# Patient Record
Sex: FEMALE | Race: ASIAN | NOT HISPANIC OR LATINO
[De-identification: names, ages, dates, MRNs, and addresses within clinical notes are randomized per-mention and may not be internally consistent; named-entity substitution may affect disease eponyms.]

---

## 2017-03-13 ENCOUNTER — APPOINTMENT (OUTPATIENT)
Dept: VASCULAR SURGERY | Facility: CLINIC | Age: 77
End: 2017-03-13

## 2017-04-25 ENCOUNTER — APPOINTMENT (OUTPATIENT)
Dept: SPINE | Facility: CLINIC | Age: 77
End: 2017-04-25

## 2017-12-30 ENCOUNTER — INPATIENT (INPATIENT)
Facility: HOSPITAL | Age: 77
LOS: 0 days | Discharge: LEFT BEFORE DISCHARGE | DRG: 638 | End: 2017-12-31
Attending: INTERNAL MEDICINE | Admitting: INTERNAL MEDICINE
Payer: MEDICARE

## 2017-12-30 VITALS
TEMPERATURE: 98 F | WEIGHT: 166.01 LBS | SYSTOLIC BLOOD PRESSURE: 115 MMHG | RESPIRATION RATE: 16 BRPM | DIASTOLIC BLOOD PRESSURE: 75 MMHG | HEIGHT: 64 IN | OXYGEN SATURATION: 100 % | HEART RATE: 57 BPM

## 2017-12-30 LAB
ALBUMIN SERPL ELPH-MCNC: 3.3 G/DL — LOW (ref 3.5–5)
ALP SERPL-CCNC: 49 U/L — SIGNIFICANT CHANGE UP (ref 40–120)
ALT FLD-CCNC: 26 U/L DA — SIGNIFICANT CHANGE UP (ref 10–60)
ANION GAP SERPL CALC-SCNC: 9 MMOL/L — SIGNIFICANT CHANGE UP (ref 5–17)
APTT BLD: 22.8 SEC — LOW (ref 27.5–37.4)
AST SERPL-CCNC: 10 U/L — SIGNIFICANT CHANGE UP (ref 10–40)
BASOPHILS # BLD AUTO: 0.2 K/UL — SIGNIFICANT CHANGE UP (ref 0–0.2)
BASOPHILS NFR BLD AUTO: 1.3 % — SIGNIFICANT CHANGE UP (ref 0–2)
BILIRUB SERPL-MCNC: 0.2 MG/DL — SIGNIFICANT CHANGE UP (ref 0.2–1.2)
BUN SERPL-MCNC: 16 MG/DL — SIGNIFICANT CHANGE UP (ref 7–18)
CALCIUM SERPL-MCNC: 8.7 MG/DL — SIGNIFICANT CHANGE UP (ref 8.4–10.5)
CHLORIDE SERPL-SCNC: 97 MMOL/L — SIGNIFICANT CHANGE UP (ref 96–108)
CO2 SERPL-SCNC: 26 MMOL/L — SIGNIFICANT CHANGE UP (ref 22–31)
CREAT SERPL-MCNC: 0.91 MG/DL — SIGNIFICANT CHANGE UP (ref 0.5–1.3)
EOSINOPHIL # BLD AUTO: 0.1 K/UL — SIGNIFICANT CHANGE UP (ref 0–0.5)
EOSINOPHIL NFR BLD AUTO: 0.8 % — SIGNIFICANT CHANGE UP (ref 0–6)
GLUCOSE SERPL-MCNC: 68 MG/DL — LOW (ref 70–99)
HCT VFR BLD CALC: 38 % — SIGNIFICANT CHANGE UP (ref 34.5–45)
HGB BLD-MCNC: 11.8 G/DL — SIGNIFICANT CHANGE UP (ref 11.5–15.5)
INR BLD: 0.89 RATIO — SIGNIFICANT CHANGE UP (ref 0.88–1.16)
LYMPHOCYTES # BLD AUTO: 35.4 % — SIGNIFICANT CHANGE UP (ref 13–44)
LYMPHOCYTES # BLD AUTO: 4.1 K/UL — HIGH (ref 1–3.3)
MCHC RBC-ENTMCNC: 26.5 PG — LOW (ref 27–34)
MCHC RBC-ENTMCNC: 31.1 GM/DL — LOW (ref 32–36)
MCV RBC AUTO: 85.1 FL — SIGNIFICANT CHANGE UP (ref 80–100)
MONOCYTES # BLD AUTO: 0.6 K/UL — SIGNIFICANT CHANGE UP (ref 0–0.9)
MONOCYTES NFR BLD AUTO: 5.3 % — SIGNIFICANT CHANGE UP (ref 2–14)
NEUTROPHILS # BLD AUTO: 6.7 K/UL — SIGNIFICANT CHANGE UP (ref 1.8–7.4)
NEUTROPHILS NFR BLD AUTO: 57.1 % — SIGNIFICANT CHANGE UP (ref 43–77)
PLATELET # BLD AUTO: 198 K/UL — SIGNIFICANT CHANGE UP (ref 150–400)
POTASSIUM SERPL-MCNC: 3.4 MMOL/L — LOW (ref 3.5–5.3)
POTASSIUM SERPL-SCNC: 3.4 MMOL/L — LOW (ref 3.5–5.3)
PROT SERPL-MCNC: 6.8 G/DL — SIGNIFICANT CHANGE UP (ref 6–8.3)
PROTHROM AB SERPL-ACNC: 9.7 SEC — LOW (ref 9.8–12.7)
RBC # BLD: 4.46 M/UL — SIGNIFICANT CHANGE UP (ref 3.8–5.2)
RBC # FLD: 14 % — SIGNIFICANT CHANGE UP (ref 10.3–14.5)
SODIUM SERPL-SCNC: 132 MMOL/L — LOW (ref 135–145)
TROPONIN I SERPL-MCNC: <0.015 NG/ML — SIGNIFICANT CHANGE UP (ref 0–0.04)
WBC # BLD: 11.7 K/UL — HIGH (ref 3.8–10.5)
WBC # FLD AUTO: 11.7 K/UL — HIGH (ref 3.8–10.5)

## 2017-12-30 PROCEDURE — 71010: CPT | Mod: 26

## 2017-12-30 PROCEDURE — 72170 X-RAY EXAM OF PELVIS: CPT | Mod: 26

## 2017-12-30 RX ORDER — SODIUM CHLORIDE 9 MG/ML
1000 INJECTION INTRAMUSCULAR; INTRAVENOUS; SUBCUTANEOUS ONCE
Qty: 0 | Refills: 0 | Status: COMPLETED | OUTPATIENT
Start: 2017-12-30 | End: 2017-12-30

## 2017-12-30 RX ADMIN — SODIUM CHLORIDE 2000 MILLILITER(S): 9 INJECTION INTRAMUSCULAR; INTRAVENOUS; SUBCUTANEOUS at 23:22

## 2017-12-30 NOTE — ED PROVIDER NOTE - CARE PLAN
Principal Discharge DX:	Syncope  Secondary Diagnosis:	Hypoglycemia Principal Discharge DX:	Syncope  Secondary Diagnosis:	Hypoglycemia  Secondary Diagnosis:	Hyponatremia

## 2017-12-30 NOTE — ED ADULT NURSE NOTE - OBJECTIVE STATEMENT
pt is A&Ox3, ambulatory with assist, able to make needs known and presents with C/O syncopal episode x1 at dinner tonight. Pt also reports right hip pain from previous HX

## 2017-12-30 NOTE — ED PROVIDER NOTE - MEDICAL DECISION MAKING DETAILS
77yoF with h/o DM, CAD p/w syncope. No arrhythmia on ECG, no e/o aortic stenosis on exam, no anemia or electrolyte abnl, no definite CP/SOB to suggest PE and no hypoxia or u/l leg swelling or new pain. Noted hypoglycemia, which may be cause or syncope. Given fluids and D50. Hemodynamically stable, no focal neuro deficits. No other e/o trauma on exam. Admitted for further monitoring, w/u, and care. 77yoF with h/o DM, CAD p/w syncope. No arrhythmia on ECG, no e/o aortic stenosis on exam, no anemia or electrolyte abnl, no definite CP/SOB to suggest PE and no hypoxia or u/l leg swelling or new pain. Noted hypoglycemia, which may be cause or syncope. Given fluids and D50. Hemodynamically stable, no focal neuro deficits. No other e/o trauma on exam. Admitted for further monitoring, w/u, and care.  Family member numbers: [phone], [phone]

## 2017-12-30 NOTE — ED PROVIDER NOTE - PHYSICAL EXAMINATION
Afebrile, hemodynamically stable  NAD  Occipital contusion  No C spine TTP  EOMI  MMM  RRR, nml S1/S2, no m/r/g  Lungs CTAB, no w/r/r  Abd soft, NT, ND, nml BS, no rebound or guarding  CN's 3-12 grossly intact  DE [place] spontaneously, no leg cyanosis or edema. L hip pain, chronic per family  Skin warm, dry, no rashes or hives

## 2017-12-30 NOTE — ED PROVIDER NOTE - OBJECTIVE STATEMENT
78 y/o female with PMHx of DM, cardiac stent, arthritis presents to the ED c/o syncope x today. Pt notes she was at a restaurant when she was feeling lightheaded and diaphoretic. Pt then fell out of the chair and had a syncopal episode, unknown if she hit her head. Pt passed out of 5 mins before regaining consciousness. Pt now c/o HA, nausea, SOB. Pt denies CP, leg pain/swelling, or any other complaints. Pt is currently on Prednisone, ASA, Nadolol, Janumet. Pt allergic to Codeine (unknown) 76 y/o female with PMHx of DM, cardiac stent, arthritis presents to the ED c/o syncope x today. Pt notes she was at a restaurant when she was feeling lightheaded and diaphoretic. Pt then fell out of the chair and had a syncopal episode, unknown if she hit her head. Pt passed out of 5 mins before regaining consciousness. Pt now c/o HA, nausea, SOB. Pt denies CP, leg pain/swelling, or any other complaints. Pt is currently on Humulin Insulin, Lisinopril, Naldone, isosorbide, Lipitor, Folic Acid, Vitamin C, Triplex, Prednisone, ASA, Nadolol, Janumet. Pt allergic to Codeine (unknown) 78 y/o female with PMHx of DM, CAD s/p stent, arthritis with chronic L hip pain, presents to the ED c/o syncope x today. Pt family notes she was at a restaurant when she was feeling lightheaded and diaphoretic. Pt then fell out of the chair and had a syncopal episode, unknown if she hit her head. Pt passed out approx 5 mins before regaining consciousness per . Had appetizers but not full meal, EMS glucose ~130. Pt now c/o HA, nausea. Uncertain about SOB. Pt denies CP, leg pain/swelling, or any other complaints. Pt is currently on Humulin Insulin, Lisinopril, Naldone, isosorbide, Lipitor, Folic Acid, Vitamin C, Triplex, Prednisone, ASA, Nadolol, Janumet. Pt allergic to Codeine (unknown) 78 y/o female with PMHx of DM, CAD s/p stent, arthritis with chronic L hip pain, presents to the ED c/o syncope x today. Pt family notes she was at a restaurant when she was feeling lightheaded and diaphoretic. Pt then fell out of the chair and had a syncopal episode, unknown if she hit her head. Pt passed out approx 5 mins before regaining consciousness per . Had appetizers but not full meal, EMS glucose ~130. Pt now c/o HA, nausea. Uncertain about SOB. Pt denies CP, leg pain/swelling, or any other complaints. Pt is currently on Humulin Insulin, Lisinopril, nadolol, isosorbide, Lipitor, Folic Acid, Vitamin C, Triplex, Prednisone, ASA, Janumet. Pt allergic to Codeine (unknown)

## 2017-12-31 ENCOUNTER — TRANSCRIPTION ENCOUNTER (OUTPATIENT)
Age: 77
End: 2017-12-31

## 2017-12-31 VITALS
OXYGEN SATURATION: 99 % | RESPIRATION RATE: 16 BRPM | TEMPERATURE: 98 F | DIASTOLIC BLOOD PRESSURE: 61 MMHG | HEART RATE: 67 BPM | SYSTOLIC BLOOD PRESSURE: 155 MMHG

## 2017-12-31 DIAGNOSIS — E04.1 NONTOXIC SINGLE THYROID NODULE: ICD-10-CM

## 2017-12-31 DIAGNOSIS — M31.6 OTHER GIANT CELL ARTERITIS: ICD-10-CM

## 2017-12-31 DIAGNOSIS — Z98.891 HISTORY OF UTERINE SCAR FROM PREVIOUS SURGERY: Chronic | ICD-10-CM

## 2017-12-31 DIAGNOSIS — I25.10 ATHEROSCLEROTIC HEART DISEASE OF NATIVE CORONARY ARTERY WITHOUT ANGINA PECTORIS: Chronic | ICD-10-CM

## 2017-12-31 DIAGNOSIS — I25.10 ATHEROSCLEROTIC HEART DISEASE OF NATIVE CORONARY ARTERY WITHOUT ANGINA PECTORIS: ICD-10-CM

## 2017-12-31 DIAGNOSIS — R55 SYNCOPE AND COLLAPSE: ICD-10-CM

## 2017-12-31 DIAGNOSIS — E11.9 TYPE 2 DIABETES MELLITUS WITHOUT COMPLICATIONS: ICD-10-CM

## 2017-12-31 DIAGNOSIS — Z29.9 ENCOUNTER FOR PROPHYLACTIC MEASURES, UNSPECIFIED: ICD-10-CM

## 2017-12-31 DIAGNOSIS — I10 ESSENTIAL (PRIMARY) HYPERTENSION: ICD-10-CM

## 2017-12-31 DIAGNOSIS — E16.2 HYPOGLYCEMIA, UNSPECIFIED: ICD-10-CM

## 2017-12-31 LAB
ANION GAP SERPL CALC-SCNC: 8 MMOL/L — SIGNIFICANT CHANGE UP (ref 5–17)
BUN SERPL-MCNC: 15 MG/DL — SIGNIFICANT CHANGE UP (ref 7–18)
CALCIUM SERPL-MCNC: 8.4 MG/DL — SIGNIFICANT CHANGE UP (ref 8.4–10.5)
CHLORIDE SERPL-SCNC: 103 MMOL/L — SIGNIFICANT CHANGE UP (ref 96–108)
CHOLEST SERPL-MCNC: 165 MG/DL — SIGNIFICANT CHANGE UP (ref 10–199)
CO2 SERPL-SCNC: 25 MMOL/L — SIGNIFICANT CHANGE UP (ref 22–31)
CREAT SERPL-MCNC: 0.83 MG/DL — SIGNIFICANT CHANGE UP (ref 0.5–1.3)
GLUCOSE BLDC GLUCOMTR-MCNC: 126 MG/DL — HIGH (ref 70–99)
GLUCOSE BLDC GLUCOMTR-MCNC: 142 MG/DL — HIGH (ref 70–99)
GLUCOSE BLDC GLUCOMTR-MCNC: 151 MG/DL — HIGH (ref 70–99)
GLUCOSE SERPL-MCNC: 125 MG/DL — HIGH (ref 70–99)
HBA1C BLD-MCNC: 7.4 % — HIGH (ref 4–5.6)
HCT VFR BLD CALC: 38.7 % — SIGNIFICANT CHANGE UP (ref 34.5–45)
HDLC SERPL-MCNC: 51 MG/DL — SIGNIFICANT CHANGE UP (ref 40–125)
HGB BLD-MCNC: 11.9 G/DL — SIGNIFICANT CHANGE UP (ref 11.5–15.5)
LIPID PNL WITH DIRECT LDL SERPL: 84 MG/DL — SIGNIFICANT CHANGE UP
MAGNESIUM SERPL-MCNC: 1.8 MG/DL — SIGNIFICANT CHANGE UP (ref 1.6–2.6)
MCHC RBC-ENTMCNC: 25.6 PG — LOW (ref 27–34)
MCHC RBC-ENTMCNC: 30.7 GM/DL — LOW (ref 32–36)
MCV RBC AUTO: 83.4 FL — SIGNIFICANT CHANGE UP (ref 80–100)
PHOSPHATE SERPL-MCNC: 3.9 MG/DL — SIGNIFICANT CHANGE UP (ref 2.5–4.5)
PLATELET # BLD AUTO: 208 K/UL — SIGNIFICANT CHANGE UP (ref 150–400)
POTASSIUM SERPL-MCNC: 3.7 MMOL/L — SIGNIFICANT CHANGE UP (ref 3.5–5.3)
POTASSIUM SERPL-SCNC: 3.7 MMOL/L — SIGNIFICANT CHANGE UP (ref 3.5–5.3)
RBC # BLD: 4.65 M/UL — SIGNIFICANT CHANGE UP (ref 3.8–5.2)
RBC # FLD: 13.6 % — SIGNIFICANT CHANGE UP (ref 10.3–14.5)
SODIUM SERPL-SCNC: 136 MMOL/L — SIGNIFICANT CHANGE UP (ref 135–145)
TOTAL CHOLESTEROL/HDL RATIO MEASUREMENT: 3.2 RATIO — LOW (ref 3.3–7.1)
TRIGL SERPL-MCNC: 150 MG/DL — HIGH (ref 10–149)
TSH SERPL-MCNC: 3.03 UU/ML — SIGNIFICANT CHANGE UP (ref 0.34–4.82)
VIT B12 SERPL-MCNC: 276 PG/ML — SIGNIFICANT CHANGE UP (ref 232–1245)
WBC # BLD: 9.8 K/UL — SIGNIFICANT CHANGE UP (ref 3.8–10.5)
WBC # FLD AUTO: 9.8 K/UL — SIGNIFICANT CHANGE UP (ref 3.8–10.5)

## 2017-12-31 PROCEDURE — 70450 CT HEAD/BRAIN W/O DYE: CPT | Mod: 26

## 2017-12-31 PROCEDURE — 82306 VITAMIN D 25 HYDROXY: CPT

## 2017-12-31 PROCEDURE — 82962 GLUCOSE BLOOD TEST: CPT

## 2017-12-31 PROCEDURE — 72125 CT NECK SPINE W/O DYE: CPT | Mod: 26

## 2017-12-31 PROCEDURE — 70450 CT HEAD/BRAIN W/O DYE: CPT

## 2017-12-31 PROCEDURE — 99285 EMERGENCY DEPT VISIT HI MDM: CPT | Mod: 25

## 2017-12-31 PROCEDURE — 80061 LIPID PANEL: CPT

## 2017-12-31 PROCEDURE — 84100 ASSAY OF PHOSPHORUS: CPT

## 2017-12-31 PROCEDURE — 85027 COMPLETE CBC AUTOMATED: CPT

## 2017-12-31 PROCEDURE — 84484 ASSAY OF TROPONIN QUANT: CPT

## 2017-12-31 PROCEDURE — 85610 PROTHROMBIN TIME: CPT

## 2017-12-31 PROCEDURE — 72170 X-RAY EXAM OF PELVIS: CPT

## 2017-12-31 PROCEDURE — 80048 BASIC METABOLIC PNL TOTAL CA: CPT

## 2017-12-31 PROCEDURE — 93005 ELECTROCARDIOGRAM TRACING: CPT

## 2017-12-31 PROCEDURE — 84443 ASSAY THYROID STIM HORMONE: CPT

## 2017-12-31 PROCEDURE — 71045 X-RAY EXAM CHEST 1 VIEW: CPT

## 2017-12-31 PROCEDURE — 83735 ASSAY OF MAGNESIUM: CPT

## 2017-12-31 PROCEDURE — 85730 THROMBOPLASTIN TIME PARTIAL: CPT

## 2017-12-31 PROCEDURE — G0378: CPT

## 2017-12-31 PROCEDURE — 83036 HEMOGLOBIN GLYCOSYLATED A1C: CPT

## 2017-12-31 PROCEDURE — 82607 VITAMIN B-12: CPT

## 2017-12-31 PROCEDURE — 93306 TTE W/DOPPLER COMPLETE: CPT

## 2017-12-31 PROCEDURE — 72125 CT NECK SPINE W/O DYE: CPT

## 2017-12-31 PROCEDURE — 80053 COMPREHEN METABOLIC PANEL: CPT

## 2017-12-31 RX ORDER — ATENOLOL 25 MG/1
25 TABLET ORAL DAILY
Qty: 0 | Refills: 0 | Status: DISCONTINUED | OUTPATIENT
Start: 2017-12-31 | End: 2017-12-31

## 2017-12-31 RX ORDER — DEXTROSE 50 % IN WATER 50 %
25 SYRINGE (ML) INTRAVENOUS ONCE
Qty: 0 | Refills: 0 | Status: DISCONTINUED | OUTPATIENT
Start: 2017-12-31 | End: 2017-12-31

## 2017-12-31 RX ORDER — FOLIC ACID 0.8 MG
1 TABLET ORAL DAILY
Qty: 0 | Refills: 0 | Status: DISCONTINUED | OUTPATIENT
Start: 2017-12-31 | End: 2017-12-31

## 2017-12-31 RX ORDER — POTASSIUM CHLORIDE 20 MEQ
40 PACKET (EA) ORAL ONCE
Qty: 0 | Refills: 0 | Status: COMPLETED | OUTPATIENT
Start: 2017-12-31 | End: 2017-12-31

## 2017-12-31 RX ORDER — INSULIN LISPRO 100/ML
VIAL (ML) SUBCUTANEOUS
Qty: 0 | Refills: 0 | Status: DISCONTINUED | OUTPATIENT
Start: 2017-12-31 | End: 2017-12-31

## 2017-12-31 RX ORDER — ISOSORBIDE DINITRATE 5 MG/1
20 TABLET ORAL
Qty: 0 | Refills: 0 | Status: DISCONTINUED | OUTPATIENT
Start: 2017-12-31 | End: 2017-12-31

## 2017-12-31 RX ORDER — ASCORBIC ACID 60 MG
500 TABLET,CHEWABLE ORAL DAILY
Qty: 0 | Refills: 0 | Status: DISCONTINUED | OUTPATIENT
Start: 2017-12-31 | End: 2017-12-31

## 2017-12-31 RX ORDER — PREGABALIN 225 MG/1
1000 CAPSULE ORAL DAILY
Qty: 0 | Refills: 0 | Status: DISCONTINUED | OUTPATIENT
Start: 2017-12-31 | End: 2017-12-31

## 2017-12-31 RX ORDER — LISINOPRIL 2.5 MG/1
20 TABLET ORAL DAILY
Qty: 0 | Refills: 0 | Status: DISCONTINUED | OUTPATIENT
Start: 2017-12-31 | End: 2017-12-31

## 2017-12-31 RX ORDER — GABAPENTIN 400 MG/1
200 CAPSULE ORAL THREE TIMES A DAY
Qty: 0 | Refills: 0 | Status: DISCONTINUED | OUTPATIENT
Start: 2017-12-31 | End: 2017-12-31

## 2017-12-31 RX ORDER — ENOXAPARIN SODIUM 100 MG/ML
40 INJECTION SUBCUTANEOUS DAILY
Qty: 0 | Refills: 0 | Status: DISCONTINUED | OUTPATIENT
Start: 2017-12-31 | End: 2017-12-31

## 2017-12-31 RX ORDER — ASPIRIN/CALCIUM CARB/MAGNESIUM 324 MG
81 TABLET ORAL DAILY
Qty: 0 | Refills: 0 | Status: DISCONTINUED | OUTPATIENT
Start: 2017-12-31 | End: 2017-12-31

## 2017-12-31 RX ORDER — ATORVASTATIN CALCIUM 80 MG/1
40 TABLET, FILM COATED ORAL AT BEDTIME
Qty: 0 | Refills: 0 | Status: DISCONTINUED | OUTPATIENT
Start: 2017-12-31 | End: 2017-12-31

## 2017-12-31 RX ADMIN — ISOSORBIDE DINITRATE 20 MILLIGRAM(S): 5 TABLET ORAL at 05:49

## 2017-12-31 RX ADMIN — GABAPENTIN 200 MILLIGRAM(S): 400 CAPSULE ORAL at 05:49

## 2017-12-31 RX ADMIN — Medication 5 MILLIGRAM(S): at 05:49

## 2017-12-31 RX ADMIN — GABAPENTIN 200 MILLIGRAM(S): 400 CAPSULE ORAL at 13:47

## 2017-12-31 RX ADMIN — Medication 500 MILLIGRAM(S): at 13:47

## 2017-12-31 RX ADMIN — ATENOLOL 25 MILLIGRAM(S): 25 TABLET ORAL at 05:49

## 2017-12-31 RX ADMIN — LISINOPRIL 20 MILLIGRAM(S): 2.5 TABLET ORAL at 05:48

## 2017-12-31 RX ADMIN — Medication 1 MILLIGRAM(S): at 13:47

## 2017-12-31 RX ADMIN — Medication 81 MILLIGRAM(S): at 13:52

## 2017-12-31 RX ADMIN — ENOXAPARIN SODIUM 40 MILLIGRAM(S): 100 INJECTION SUBCUTANEOUS at 13:48

## 2017-12-31 RX ADMIN — PREGABALIN 1000 MICROGRAM(S): 225 CAPSULE ORAL at 13:48

## 2017-12-31 RX ADMIN — Medication 40 MILLIEQUIVALENT(S): at 03:17

## 2017-12-31 NOTE — H&P ADULT - RS GEN PE MLT RESP DETAILS PC
respirations non-labored/no rhonchi/no wheezes/airway patent/breath sounds equal/normal/clear to auscultation bilaterally/no rales/good air movement

## 2017-12-31 NOTE — DISCHARGE NOTE ADULT - PATIENT PORTAL LINK FT
“You can access the [employer] Patient Portal, offered by [facility], by registering with the following website: [email or web address]”

## 2017-12-31 NOTE — H&P ADULT - PROBLEM SELECTOR PLAN 5
f/u HbA1c  hold home med 22u humalog pre-breakfast and pre-dinner, janumet 50/1000  HSS  AccuCheck  DASH/low carb diet c/w home med lisinopril, nadolol (equivalent conversion to atenolol)  Monitor BP  DASH/low carb diet

## 2017-12-31 NOTE — H&P ADULT - HISTORY OF PRESENT ILLNESS
76 y/o female with PMHx of DM, cardiac stent, arthritis presents to the ED c/o syncope x today. Pt notes she was at a restaurant when she was feeling lightheaded and diaphoretic. Pt then fell out of the chair and had a syncopal episode, unknown if she hit her head. Pt passed out of 5 mins before regaining consciousness. Pt now c/o HA, nausea, SOB. Pt denies CP, leg pain/swelling, or any other complaints. Pt is currently on Humulin Insulin, Lisinopril, Naldone, isosorbide, Lipitor, Folic Acid, Vitamin C, Triplex, Prednisone, ASA, Nadolol, Janumet. Pt allergic to Codeine (unknown) 77 F, from home lives with family ambulates independently, w/ PMHx of DM, cardiac stent, arthritis presents to the ED c/o syncope x today. Pt notes she was at a restaurant when she was feeling lightheaded and diaphoretic.     Pt then fell out of the chair and had a syncopal episode, unknown if she hit her head. Pt passed out of 5 mins before regaining consciousness. Pt now c/o HA, nausea, SOB. Pt denies CP, leg pain/swelling, or any other complaints. Pt is currently on Humulin Insulin, Lisinopril, Naldone, isosorbide, Lipitor, Folic Acid, Vitamin C, Triplex, Prednisone, ASA, Nadolol, Janumet. Pt allergic to Codeine (unknown) 77 F, from home lives with family ambulates independently, w/ PMHx CAD s/p 3 stent 1998, HTN, DM, temporal arthritis, arthritis of hands and hip BIBEMS c/o witnessed syncopal episode today. Pt states she was at a social event and felt dizzy and had diaphoresis after administering herself home med regimen of insulin humalog 22units pre-dinner (she routinely checks her f/s prior to insulin injection but she didn't bring a glucometer with her to the event). After she became symptomatic, she drank juice then "passed out". According to friends/ family, the syncopal episode lasted <1 minute. Pt was sitting in a chair and didn't injure herself. Pt denied urinary incontinence tongue biting, N/V, headache, CP, SOB, neurological symptoms. Denies cigarette or EtOH usage. Pt follows up reguarly with PCP and cardiologist and claimed to have "normal" NST and ECHO recently.

## 2017-12-31 NOTE — H&P ADULT - NEUROLOGICAL DETAILS
alert and oriented x 3/responds to pain/deep reflexes intact/sensation intact/responds to verbal commands/cranial nerves intact

## 2017-12-31 NOTE — DISCHARGE NOTE ADULT - HOSPITAL COURSE
77 F, from home lives with family ambulates independently, w/ PMHx CAD s/p 3 stent 1998, HTN, DM, temporal arthritis, arthritis of hands and hip BIBEMS c/o witnessed syncopal episode today. Pt states she was at a social event and felt dizzy and had diaphoresis after administering herself home med regimen of insulin humalog 22units pre-dinner (she routinely checks her f/s prior to insulin injection but she didn't bring a glucometer with her to the event). After she became symptomatic, she drank juice then "passed out". According to friends/ family, the syncopal episode lasted <1 minute. Pt was sitting in a chair and didn't injure herself. Pt denied urinary incontinence, tongue biting, N/V, headache, CP, SOB, neurological symptoms. Denied cigarette or EtOH usage. Pt follows up reguarly with PCP and cardiologist and claimed to have "normal" NST and ECHO recently.    Pt was admitted to telemetry with syncopal episode, likely 2/2 hypoglycemia or cardiogenic causes (less likely).      Syncope, likely 2/2 hypoglycemia or cardiogenic causes (less likely)  EKG NSR VR 60 no ST/T wave changes  CT head prelim neg for acute etiologies; Cerebral volume loss and ventriculomegaly. Mild chronic ischemic changes in the frontoparietal white matter.  Echo performed.  However, results not as yet available.      Hypoglycemia.    Home diabetic meds held  Pt started on HSS/fingersticks/serum glucose monitored  A1C 7.4  ADA/DASH diet    Thyroid nodule.  CT C-spine: Hypodense nodule in the left thyroid lobe measuring up to 3 cm for which nonemergent thyroid ultrasound and/or FNA/biopsy is recommended.  TSH 3.03  Pt to followup with PCP in outpatient setting    Temporal arteritis.    Stable  c/w PO pred 5mg AM, 2.5mg PM  Pt to followup with outpatient rheumatologist    Hypertension.   c/w home med lisinopril, nadolol (equivalent conversion to atenolol)  Monitor BP  DASH/low carb diet.     CAD (coronary artery disease).    Stable  c/w cardiac meds.     Need for prophylactic measure.    IMPROVE = 2 for age and immobilization  lovenox for DVT ppx  GI ppx not indicated.     Pt's  and patient requested that patient be discharged AMA, despite risks/benefits made known and understood by patient and .   stated that he will take full responsibility for his wife.  Pt advised to followup with PCP Dr. Nae Mendenhall, [phone] immediately upon discharge.

## 2017-12-31 NOTE — H&P ADULT - PROBLEM SELECTOR PLAN 1
likely 2/2 hypoglycemia or cardiogenic causes (less likely)  pt usually checks f/s before administering humalog pre-meal  this evening she didn't check and self administered insulin  EMS f/s 92 (this is after pt felt dizzy, had juice, and had syncope)  EKG NSR VR 60 no ST/T wave changes  CT head prelim neg for acute etiologies  f/u ECHO  f/u A1c, TSH, B12  Tele monitoring likely 2/2 hypoglycemia or cardiogenic causes (less likely)  pt usually checks f/s before administering humalog pre-meal  this evening she didn't check and self administered insulin  EMS f/s 92 (this is after pt felt dizzy, had juice, and had syncope)  EKG NSR VR 60 no ST/T wave changes  CT head prelim neg for acute etiologies; Cerebral volume loss and ventriculomegaly. Mild chronic ischemic changes in the frontoparietal white matter.  f/u ECHO  f/u A1c, TSH, B12  Tele monitoring likely 2/2 hypoglycemia or cardiogenic causes (less likely)  pt usually checks f/s before administering humalog pre-meal  this evening she didn't check and self administered insulin  EMS f/s 92 (this is after pt felt dizzy, had juice, and had syncope)  EKG NSR VR 60 no ST/T wave changes  CT head prelim neg for acute etiologies; Cerebral volume loss and ventriculomegaly. Mild chronic ischemic changes in the frontoparietal white matter.  f/u ECHO  f/u A1c, TSH, B12  f/u orthostatics  Tele monitoring likely 2/2 hypoglycemia or cardiogenic causes (less likely)  pt usually checks f/s before administering humalog pre-meal  this evening she didn't check and self administered insulin  EMS f/s 92 (this is after pt felt dizzy, had juice, and had syncope)  EKG NSR VR 60 no ST/T wave changes  CT head prelim neg for acute etiologies; Cerebral volume loss and ventriculomegaly. Mild chronic ischemic changes in the frontoparietal white matter.  f/u ECHO  f/u A1c, TSH, B12  f/u orthostatics  Tele monitoring  will hold off on carotid doppler/MRI head for now as syncope is very likely 2/2 hypoglycemia likely 2/2 hypoglycemia or cardiogenic causes (less likely)  pt usually checks f/s before administering humulin 70/30 pre-meal  this evening she didn't check and self administered insulin  EMS f/s 92 (this is after pt felt dizzy, had juice, and had syncope)  EKG NSR VR 60 no ST/T wave changes  CT head prelim neg for acute etiologies; Cerebral volume loss and ventriculomegaly. Mild chronic ischemic changes in the frontoparietal white matter.  f/u ECHO  f/u A1c, TSH, B12  f/u orthostatics  Tele monitoring  will hold off on carotid doppler/MRI head for now as syncope is very likely 2/2 hypoglycemia

## 2017-12-31 NOTE — H&P ADULT - ASSESSMENT
77 F, from home lives with family ambulates independently, w/ PMHx CAD s/p 3 stent 1998, HTN, DM, temporal arthritis, arthritis of hands and hip BIBEMS c/o witnessed syncopal episode today.     Pt admitted for management of syncope likely 2/2 hypoglycemia.

## 2017-12-31 NOTE — H&P ADULT - PROBLEM SELECTOR PLAN 4
c/w home med lisinopril, nadolol (equivalent conversion to atenolol)  Monitor BP  DASH/low carb diet Stable  c/w PO pred 5mg AM, 2.5mg PM  f/u outpt rheum

## 2017-12-31 NOTE — H&P ADULT - PROBLEM SELECTOR PLAN 3
Stable  c/w PO pred 5mg AM, 2.5mg PM  f/u outpt rheum CT C-spine: Hypodense nodule in the left thyroid lobe measuring up to 3 cm for which nonemergent thyroid ultrasound and/or FNA/biopsy is recommended.  f/u TSH  outpt workup

## 2017-12-31 NOTE — H&P ADULT - PROBLEM SELECTOR PLAN 6
Stable  c/w cardiac meds f/u HbA1c  hold home med 22u humalog pre-breakfast and pre-dinner, janumet 50/1000  HSS  AccuCheck  DASH/low carb diet

## 2017-12-31 NOTE — DISCHARGE NOTE ADULT - MEDICATION SUMMARY - MEDICATIONS TO TAKE
I will START or STAY ON the medications listed below when I get home from the hospital:    predniSONE 5 mg oral tablet  -- 1 tab(s) by mouth once a day (in the morning)  -- Indication: For Temporal arteritis    predniSONE 2.5 mg oral tablet  -- 1 tab(s) by mouth once a day (at bedtime)  -- Indication: For Temporal arteritis    aspirin 81 mg oral tablet  -- 1 tab(s) by mouth once a day  -- Indication: For CAD (coronary artery disease)    lisinopril 20 mg oral tablet  -- 1 tab(s) by mouth once a day  -- Indication: For HTN    isosorbide dinitrate 20 mg oral tablet  -- 1 tab(s) by mouth 2 times a day  -- Indication: For HTN    gabapentin  -- 200 milligram(s) by mouth 3 times a day  -- Indication: For Anticonvulsant    Janumet 50 mg-1000 mg oral tablet  -- 1 tab(s) by mouth 2 times a day  -- Indication: For DM (diabetes mellitus)    Lipitor 20 mg oral tablet  -- 1 tab(s) by mouth once a day  -- Indication: For HLD    nadolol 20 mg oral tablet  -- 2 tab(s) by mouth once a day  -- Indication: For HTN    folic acid 1 mg oral tablet  -- 1 tab(s) by mouth once a day  -- Indication: For Supplement    Vitamin B12 1000 mcg oral tablet  -- 1 tab(s) by mouth once a day  -- Indication: For Supplement    Vitamin C 1000 mg oral tablet  -- 1 tab(s) by mouth once a day  -- Indication: For Supplement

## 2017-12-31 NOTE — H&P ADULT - FAMILY HISTORY
Sibling  Still living? Unknown  Family history of diabetes mellitus, Age at diagnosis: Age Unknown  Family history of early CAD, Age at diagnosis: Age Unknown

## 2017-12-31 NOTE — DISCHARGE NOTE ADULT - PROVIDER TOKENS
FREE:[LAST:[MD Za],FIRST:[Nae],PHONE:[(   )    -],FAX:[(   )    -],ADDRESS:[[address] [place] [address]]]

## 2017-12-31 NOTE — PATIENT PROFILE ADULT. - HEALTH/HEALTHCARE ANXIETIES, PROFILE
"I have not gotten my Prednisone since I am here and my doctor says I am not to stop it". Referred to Primary Nurse Randy

## 2017-12-31 NOTE — DISCHARGE NOTE ADULT - CARE PROVIDER_API CALL
MD Za, Nae  [address], [address]  [place] [id number]  Phone: (   )    -  Fax: (   )    -

## 2017-12-31 NOTE — DISCHARGE NOTE ADULT - CARE PLAN
Principal Discharge DX:	Syncope  Goal:	resolution  Instructions for follow-up, activity and diet:	Followup with PCP upon discharge  Secondary Diagnosis:	Hypoglycemia  Instructions for follow-up, activity and diet:	Followup with PCP upon discharge  Secondary Diagnosis:	Temporal arteritis  Instructions for follow-up, activity and diet:	Followup with PCP upon discharge  Secondary Diagnosis:	Thyroid nodule  Instructions for follow-up, activity and diet:	Followup with PCP upon discharge  Secondary Diagnosis:	Hypertension  Instructions for follow-up, activity and diet:	Followup with PCP upon discharge  Secondary Diagnosis:	CAD (coronary artery disease)  Instructions for follow-up, activity and diet:	Followup with PCP upon discharge

## 2017-12-31 NOTE — H&P ADULT - PROBLEM SELECTOR PLAN 7
IMPROVE = 2 for age and immobilization  lovenox for DVT ppx  GI ppx not indicated Stable  c/w cardiac meds

## 2017-12-31 NOTE — H&P ADULT - PMH
Arthritis    CAD (coronary artery disease)    DM (diabetes mellitus)    Hypertension    Stented coronary artery    Temporal arteritis

## 2018-01-01 LAB — 24R-OH-CALCIDIOL SERPL-MCNC: 34.3 NG/ML — SIGNIFICANT CHANGE UP (ref 30–80)

## 2020-01-01 ENCOUNTER — INPATIENT (INPATIENT)
Facility: HOSPITAL | Age: 80
LOS: 8 days | End: 2020-04-11
Attending: INTERNAL MEDICINE | Admitting: INTERNAL MEDICINE
Payer: MEDICARE

## 2020-01-01 VITALS
OXYGEN SATURATION: 97 % | HEIGHT: 64 IN | DIASTOLIC BLOOD PRESSURE: 73 MMHG | SYSTOLIC BLOOD PRESSURE: 154 MMHG | RESPIRATION RATE: 18 BRPM | HEART RATE: 67 BPM | WEIGHT: 160.06 LBS | TEMPERATURE: 98 F

## 2020-01-01 DIAGNOSIS — I25.10 ATHEROSCLEROTIC HEART DISEASE OF NATIVE CORONARY ARTERY WITHOUT ANGINA PECTORIS: ICD-10-CM

## 2020-01-01 DIAGNOSIS — Z79.82 LONG TERM (CURRENT) USE OF ASPIRIN: ICD-10-CM

## 2020-01-01 DIAGNOSIS — Z79.84 LONG TERM (CURRENT) USE OF ORAL HYPOGLYCEMIC DRUGS: ICD-10-CM

## 2020-01-01 DIAGNOSIS — I10 ESSENTIAL (PRIMARY) HYPERTENSION: ICD-10-CM

## 2020-01-01 DIAGNOSIS — E78.5 HYPERLIPIDEMIA, UNSPECIFIED: ICD-10-CM

## 2020-01-01 DIAGNOSIS — E87.1 HYPO-OSMOLALITY AND HYPONATREMIA: ICD-10-CM

## 2020-01-01 DIAGNOSIS — I25.10 ATHEROSCLEROTIC HEART DISEASE OF NATIVE CORONARY ARTERY WITHOUT ANGINA PECTORIS: Chronic | ICD-10-CM

## 2020-01-01 DIAGNOSIS — J18.9 PNEUMONIA, UNSPECIFIED ORGANISM: ICD-10-CM

## 2020-01-01 DIAGNOSIS — U07.1 COVID-19: ICD-10-CM

## 2020-01-01 DIAGNOSIS — J81.1 CHRONIC PULMONARY EDEMA: ICD-10-CM

## 2020-01-01 DIAGNOSIS — Z95.5 PRESENCE OF CORONARY ANGIOPLASTY IMPLANT AND GRAFT: ICD-10-CM

## 2020-01-01 DIAGNOSIS — Z29.9 ENCOUNTER FOR PROPHYLACTIC MEASURES, UNSPECIFIED: ICD-10-CM

## 2020-01-01 DIAGNOSIS — I45.10 UNSPECIFIED RIGHT BUNDLE-BRANCH BLOCK: ICD-10-CM

## 2020-01-01 DIAGNOSIS — M19.90 UNSPECIFIED OSTEOARTHRITIS, UNSPECIFIED SITE: ICD-10-CM

## 2020-01-01 DIAGNOSIS — E11.9 TYPE 2 DIABETES MELLITUS WITHOUT COMPLICATIONS: ICD-10-CM

## 2020-01-01 DIAGNOSIS — E83.39 OTHER DISORDERS OF PHOSPHORUS METABOLISM: ICD-10-CM

## 2020-01-01 DIAGNOSIS — N17.9 ACUTE KIDNEY FAILURE, UNSPECIFIED: ICD-10-CM

## 2020-01-01 DIAGNOSIS — J12.89 OTHER VIRAL PNEUMONIA: ICD-10-CM

## 2020-01-01 DIAGNOSIS — Z98.891 HISTORY OF UTERINE SCAR FROM PREVIOUS SURGERY: Chronic | ICD-10-CM

## 2020-01-01 DIAGNOSIS — M31.6 OTHER GIANT CELL ARTERITIS: ICD-10-CM

## 2020-01-01 DIAGNOSIS — D72.829 ELEVATED WHITE BLOOD CELL COUNT, UNSPECIFIED: ICD-10-CM

## 2020-01-01 DIAGNOSIS — Z88.5 ALLERGY STATUS TO NARCOTIC AGENT: ICD-10-CM

## 2020-01-01 DIAGNOSIS — I21.4 NON-ST ELEVATION (NSTEMI) MYOCARDIAL INFARCTION: ICD-10-CM

## 2020-01-01 DIAGNOSIS — J80 ACUTE RESPIRATORY DISTRESS SYNDROME: ICD-10-CM

## 2020-01-01 LAB
ALBUMIN SERPL ELPH-MCNC: 2.1 G/DL — LOW (ref 3.3–5)
ALBUMIN SERPL ELPH-MCNC: 2.5 G/DL — LOW (ref 3.3–5)
ALP SERPL-CCNC: 147 U/L — HIGH (ref 40–120)
ALP SERPL-CCNC: 59 U/L — SIGNIFICANT CHANGE UP (ref 40–120)
ALP SERPL-CCNC: 73 U/L — SIGNIFICANT CHANGE UP (ref 40–120)
ALP SERPL-CCNC: 82 U/L — SIGNIFICANT CHANGE UP (ref 40–120)
ALT FLD-CCNC: 35 U/L — SIGNIFICANT CHANGE UP (ref 12–78)
ALT FLD-CCNC: 41 U/L — SIGNIFICANT CHANGE UP (ref 12–78)
ALT FLD-CCNC: 42 U/L — SIGNIFICANT CHANGE UP (ref 12–78)
ALT FLD-CCNC: 48 U/L — SIGNIFICANT CHANGE UP (ref 12–78)
ANION GAP SERPL CALC-SCNC: 10 MMOL/L — SIGNIFICANT CHANGE UP (ref 5–17)
ANION GAP SERPL CALC-SCNC: 14 MMOL/L — SIGNIFICANT CHANGE UP (ref 5–17)
ANION GAP SERPL CALC-SCNC: 6 MMOL/L — SIGNIFICANT CHANGE UP (ref 5–17)
ANION GAP SERPL CALC-SCNC: 7 MMOL/L — SIGNIFICANT CHANGE UP (ref 5–17)
ANION GAP SERPL CALC-SCNC: 8 MMOL/L — SIGNIFICANT CHANGE UP (ref 5–17)
ANION GAP SERPL CALC-SCNC: 9 MMOL/L — SIGNIFICANT CHANGE UP (ref 5–17)
ANISOCYTOSIS BLD QL: SLIGHT — SIGNIFICANT CHANGE UP
APTT BLD: 101.8 SEC — HIGH (ref 28.5–37)
APTT BLD: 29.4 SEC — SIGNIFICANT CHANGE UP (ref 28.5–37)
APTT BLD: 31.1 SEC — SIGNIFICANT CHANGE UP (ref 28.5–37)
APTT BLD: 36.9 SEC — SIGNIFICANT CHANGE UP (ref 28.5–37)
APTT BLD: 41.3 SEC — HIGH (ref 28.5–37)
APTT BLD: 52.2 SEC — HIGH (ref 28.5–37)
APTT BLD: 53.2 SEC — HIGH (ref 28.5–37)
APTT BLD: 98.9 SEC — HIGH (ref 28.5–37)
AST SERPL-CCNC: 54 U/L — HIGH (ref 15–37)
AST SERPL-CCNC: 71 U/L — HIGH (ref 15–37)
AST SERPL-CCNC: 72 U/L — HIGH (ref 15–37)
AST SERPL-CCNC: 88 U/L — HIGH (ref 15–37)
BASE EXCESS BLDA CALC-SCNC: -2.8 MMOL/L — LOW (ref -2–2)
BASE EXCESS BLDA CALC-SCNC: -7.6 MMOL/L — LOW (ref -2–2)
BASOPHILS # BLD AUTO: 0 K/UL — SIGNIFICANT CHANGE UP (ref 0–0.2)
BASOPHILS # BLD AUTO: 0.02 K/UL — SIGNIFICANT CHANGE UP (ref 0–0.2)
BASOPHILS NFR BLD AUTO: 0 % — SIGNIFICANT CHANGE UP (ref 0–2)
BASOPHILS NFR BLD AUTO: 0.1 % — SIGNIFICANT CHANGE UP (ref 0–2)
BASOPHILS NFR BLD AUTO: 0.1 % — SIGNIFICANT CHANGE UP (ref 0–2)
BASOPHILS NFR BLD AUTO: 0.2 % — SIGNIFICANT CHANGE UP (ref 0–2)
BILIRUB SERPL-MCNC: 0.3 MG/DL — SIGNIFICANT CHANGE UP (ref 0.2–1.2)
BILIRUB SERPL-MCNC: 0.4 MG/DL — SIGNIFICANT CHANGE UP (ref 0.2–1.2)
BILIRUB SERPL-MCNC: 0.4 MG/DL — SIGNIFICANT CHANGE UP (ref 0.2–1.2)
BILIRUB SERPL-MCNC: 0.5 MG/DL — SIGNIFICANT CHANGE UP (ref 0.2–1.2)
BLOOD GAS COMMENTS: SIGNIFICANT CHANGE UP
BLOOD GAS SOURCE: SIGNIFICANT CHANGE UP
BLOOD GAS SOURCE: SIGNIFICANT CHANGE UP
BUN SERPL-MCNC: 20 MG/DL — SIGNIFICANT CHANGE UP (ref 7–23)
BUN SERPL-MCNC: 27 MG/DL — HIGH (ref 7–23)
BUN SERPL-MCNC: 28 MG/DL — HIGH (ref 7–23)
BUN SERPL-MCNC: 30 MG/DL — HIGH (ref 7–23)
BUN SERPL-MCNC: 30 MG/DL — HIGH (ref 7–23)
BUN SERPL-MCNC: 33 MG/DL — HIGH (ref 7–23)
BUN SERPL-MCNC: 51 MG/DL — HIGH (ref 7–23)
BUN SERPL-MCNC: 8 MG/DL — SIGNIFICANT CHANGE UP (ref 7–23)
CALCIUM SERPL-MCNC: 8.4 MG/DL — LOW (ref 8.5–10.1)
CALCIUM SERPL-MCNC: 8.5 MG/DL — SIGNIFICANT CHANGE UP (ref 8.5–10.1)
CALCIUM SERPL-MCNC: 8.6 MG/DL — SIGNIFICANT CHANGE UP (ref 8.5–10.1)
CALCIUM SERPL-MCNC: 8.6 MG/DL — SIGNIFICANT CHANGE UP (ref 8.5–10.1)
CALCIUM SERPL-MCNC: 8.7 MG/DL — SIGNIFICANT CHANGE UP (ref 8.5–10.1)
CALCIUM SERPL-MCNC: 8.7 MG/DL — SIGNIFICANT CHANGE UP (ref 8.5–10.1)
CALCIUM SERPL-MCNC: 9.1 MG/DL — SIGNIFICANT CHANGE UP (ref 8.5–10.1)
CALCIUM SERPL-MCNC: 9.6 MG/DL — SIGNIFICANT CHANGE UP (ref 8.5–10.1)
CHLORIDE SERPL-SCNC: 100 MMOL/L — SIGNIFICANT CHANGE UP (ref 96–108)
CHLORIDE SERPL-SCNC: 104 MMOL/L — SIGNIFICANT CHANGE UP (ref 96–108)
CHLORIDE SERPL-SCNC: 109 MMOL/L — HIGH (ref 96–108)
CHLORIDE SERPL-SCNC: 111 MMOL/L — HIGH (ref 96–108)
CHLORIDE SERPL-SCNC: 122 MMOL/L — HIGH (ref 96–108)
CHLORIDE SERPL-SCNC: 122 MMOL/L — HIGH (ref 96–108)
CHLORIDE SERPL-SCNC: 98 MMOL/L — SIGNIFICANT CHANGE UP (ref 96–108)
CHLORIDE SERPL-SCNC: 98 MMOL/L — SIGNIFICANT CHANGE UP (ref 96–108)
CHOLEST SERPL-MCNC: 142 MG/DL — SIGNIFICANT CHANGE UP (ref 10–199)
CK SERPL-CCNC: 198 U/L — HIGH (ref 26–192)
CO2 SERPL-SCNC: 19 MMOL/L — LOW (ref 22–31)
CO2 SERPL-SCNC: 19 MMOL/L — LOW (ref 22–31)
CO2 SERPL-SCNC: 20 MMOL/L — LOW (ref 22–31)
CO2 SERPL-SCNC: 23 MMOL/L — SIGNIFICANT CHANGE UP (ref 22–31)
CO2 SERPL-SCNC: 24 MMOL/L — SIGNIFICANT CHANGE UP (ref 22–31)
CO2 SERPL-SCNC: 24 MMOL/L — SIGNIFICANT CHANGE UP (ref 22–31)
CREAT SERPL-MCNC: 0.92 MG/DL — SIGNIFICANT CHANGE UP (ref 0.5–1.3)
CREAT SERPL-MCNC: 0.98 MG/DL — SIGNIFICANT CHANGE UP (ref 0.5–1.3)
CREAT SERPL-MCNC: 0.99 MG/DL — SIGNIFICANT CHANGE UP (ref 0.5–1.3)
CREAT SERPL-MCNC: 1.16 MG/DL — SIGNIFICANT CHANGE UP (ref 0.5–1.3)
CREAT SERPL-MCNC: 1.17 MG/DL — SIGNIFICANT CHANGE UP (ref 0.5–1.3)
CREAT SERPL-MCNC: 1.19 MG/DL — SIGNIFICANT CHANGE UP (ref 0.5–1.3)
CREAT SERPL-MCNC: 1.31 MG/DL — HIGH (ref 0.5–1.3)
CREAT SERPL-MCNC: 2.95 MG/DL — HIGH (ref 0.5–1.3)
CRP SERPL-MCNC: 14.14 MG/DL — HIGH (ref 0–0.4)
CRP SERPL-MCNC: 23.52 MG/DL — HIGH (ref 0–0.4)
CRP SERPL-MCNC: 33.76 MG/DL — HIGH (ref 0–0.4)
CRP SERPL-MCNC: 6.37 MG/DL — HIGH (ref 0–0.4)
D DIMER BLD IA.RAPID-MCNC: 4091 NG/ML DDU — HIGH
D DIMER BLD IA.RAPID-MCNC: 9751 NG/ML DDU — HIGH
D DIMER BLD IA.RAPID-MCNC: HIGH NG/ML DDU
D DIMER BLD IA.RAPID-MCNC: HIGH NG/ML DDU
EOSINOPHIL # BLD AUTO: 0 K/UL — SIGNIFICANT CHANGE UP (ref 0–0.5)
EOSINOPHIL # BLD AUTO: 0.02 K/UL — SIGNIFICANT CHANGE UP (ref 0–0.5)
EOSINOPHIL NFR BLD AUTO: 0 % — SIGNIFICANT CHANGE UP (ref 0–6)
EOSINOPHIL NFR BLD AUTO: 0.2 % — SIGNIFICANT CHANGE UP (ref 0–6)
FERRITIN SERPL-MCNC: 231 NG/ML — HIGH (ref 15–150)
FERRITIN SERPL-MCNC: 316 NG/ML — HIGH (ref 15–150)
FERRITIN SERPL-MCNC: 324 NG/ML — HIGH (ref 15–150)
FERRITIN SERPL-MCNC: 4360 NG/ML — HIGH (ref 15–150)
FERRITIN SERPL-MCNC: 528 NG/ML — HIGH (ref 15–150)
GLUCOSE BLDC GLUCOMTR-MCNC: 114 MG/DL — HIGH (ref 70–99)
GLUCOSE BLDC GLUCOMTR-MCNC: 115 MG/DL — HIGH (ref 70–99)
GLUCOSE BLDC GLUCOMTR-MCNC: 130 MG/DL — HIGH (ref 70–99)
GLUCOSE BLDC GLUCOMTR-MCNC: 142 MG/DL — HIGH (ref 70–99)
GLUCOSE BLDC GLUCOMTR-MCNC: 158 MG/DL — HIGH (ref 70–99)
GLUCOSE BLDC GLUCOMTR-MCNC: 163 MG/DL — HIGH (ref 70–99)
GLUCOSE BLDC GLUCOMTR-MCNC: 165 MG/DL — HIGH (ref 70–99)
GLUCOSE BLDC GLUCOMTR-MCNC: 167 MG/DL — HIGH (ref 70–99)
GLUCOSE BLDC GLUCOMTR-MCNC: 176 MG/DL — HIGH (ref 70–99)
GLUCOSE BLDC GLUCOMTR-MCNC: 182 MG/DL — HIGH (ref 70–99)
GLUCOSE BLDC GLUCOMTR-MCNC: 195 MG/DL — HIGH (ref 70–99)
GLUCOSE BLDC GLUCOMTR-MCNC: 199 MG/DL — HIGH (ref 70–99)
GLUCOSE BLDC GLUCOMTR-MCNC: 208 MG/DL — HIGH (ref 70–99)
GLUCOSE BLDC GLUCOMTR-MCNC: 211 MG/DL — HIGH (ref 70–99)
GLUCOSE BLDC GLUCOMTR-MCNC: 224 MG/DL — HIGH (ref 70–99)
GLUCOSE BLDC GLUCOMTR-MCNC: 224 MG/DL — HIGH (ref 70–99)
GLUCOSE BLDC GLUCOMTR-MCNC: 227 MG/DL — HIGH (ref 70–99)
GLUCOSE BLDC GLUCOMTR-MCNC: 238 MG/DL — HIGH (ref 70–99)
GLUCOSE BLDC GLUCOMTR-MCNC: 240 MG/DL — HIGH (ref 70–99)
GLUCOSE BLDC GLUCOMTR-MCNC: 241 MG/DL — HIGH (ref 70–99)
GLUCOSE BLDC GLUCOMTR-MCNC: 241 MG/DL — HIGH (ref 70–99)
GLUCOSE BLDC GLUCOMTR-MCNC: 244 MG/DL — HIGH (ref 70–99)
GLUCOSE BLDC GLUCOMTR-MCNC: 246 MG/DL — HIGH (ref 70–99)
GLUCOSE BLDC GLUCOMTR-MCNC: 287 MG/DL — HIGH (ref 70–99)
GLUCOSE BLDC GLUCOMTR-MCNC: 288 MG/DL — HIGH (ref 70–99)
GLUCOSE BLDC GLUCOMTR-MCNC: 298 MG/DL — HIGH (ref 70–99)
GLUCOSE BLDC GLUCOMTR-MCNC: 305 MG/DL — HIGH (ref 70–99)
GLUCOSE BLDC GLUCOMTR-MCNC: 330 MG/DL — HIGH (ref 70–99)
GLUCOSE BLDC GLUCOMTR-MCNC: 340 MG/DL — HIGH (ref 70–99)
GLUCOSE BLDC GLUCOMTR-MCNC: 345 MG/DL — HIGH (ref 70–99)
GLUCOSE BLDC GLUCOMTR-MCNC: 389 MG/DL — HIGH (ref 70–99)
GLUCOSE BLDC GLUCOMTR-MCNC: 393 MG/DL — HIGH (ref 70–99)
GLUCOSE BLDC GLUCOMTR-MCNC: 401 MG/DL — HIGH (ref 70–99)
GLUCOSE BLDC GLUCOMTR-MCNC: 402 MG/DL — HIGH (ref 70–99)
GLUCOSE SERPL-MCNC: 122 MG/DL — HIGH (ref 70–99)
GLUCOSE SERPL-MCNC: 170 MG/DL — HIGH (ref 70–99)
GLUCOSE SERPL-MCNC: 195 MG/DL — HIGH (ref 70–99)
GLUCOSE SERPL-MCNC: 232 MG/DL — HIGH (ref 70–99)
GLUCOSE SERPL-MCNC: 244 MG/DL — HIGH (ref 70–99)
GLUCOSE SERPL-MCNC: 249 MG/DL — HIGH (ref 70–99)
GLUCOSE SERPL-MCNC: 257 MG/DL — HIGH (ref 70–99)
GLUCOSE SERPL-MCNC: 342 MG/DL — HIGH (ref 70–99)
HBA1C BLD-MCNC: 7.8 % — HIGH (ref 4–5.6)
HCO3 BLDA-SCNC: 16 MMOL/L — LOW (ref 21–29)
HCO3 BLDA-SCNC: 22 MMOL/L — SIGNIFICANT CHANGE UP (ref 21–29)
HCT VFR BLD CALC: 29.5 % — LOW (ref 34.5–45)
HCT VFR BLD CALC: 31.4 % — LOW (ref 34.5–45)
HCT VFR BLD CALC: 32.1 % — LOW (ref 34.5–45)
HCT VFR BLD CALC: 32.2 % — LOW (ref 34.5–45)
HCT VFR BLD CALC: 32.3 % — LOW (ref 34.5–45)
HCT VFR BLD CALC: 33.9 % — LOW (ref 34.5–45)
HCT VFR BLD CALC: 34.1 % — LOW (ref 34.5–45)
HCT VFR BLD CALC: 34.4 % — LOW (ref 34.5–45)
HCT VFR BLD CALC: 34.8 % — SIGNIFICANT CHANGE UP (ref 34.5–45)
HCT VFR BLD CALC: 35.4 % — SIGNIFICANT CHANGE UP (ref 34.5–45)
HDLC SERPL-MCNC: 21 MG/DL — LOW
HGB BLD-MCNC: 10.1 G/DL — LOW (ref 11.5–15.5)
HGB BLD-MCNC: 10.4 G/DL — LOW (ref 11.5–15.5)
HGB BLD-MCNC: 10.7 G/DL — LOW (ref 11.5–15.5)
HGB BLD-MCNC: 10.8 G/DL — LOW (ref 11.5–15.5)
HGB BLD-MCNC: 9.1 G/DL — LOW (ref 11.5–15.5)
HGB BLD-MCNC: 9.1 G/DL — LOW (ref 11.5–15.5)
HGB BLD-MCNC: 9.5 G/DL — LOW (ref 11.5–15.5)
HGB BLD-MCNC: 9.8 G/DL — LOW (ref 11.5–15.5)
HOROWITZ INDEX BLDA+IHG-RTO: 100 — SIGNIFICANT CHANGE UP
HOROWITZ INDEX BLDA+IHG-RTO: 100 — SIGNIFICANT CHANGE UP
IMM GRANULOCYTES NFR BLD AUTO: 0.4 % — SIGNIFICANT CHANGE UP (ref 0–1.5)
IMM GRANULOCYTES NFR BLD AUTO: 0.4 % — SIGNIFICANT CHANGE UP (ref 0–1.5)
IMM GRANULOCYTES NFR BLD AUTO: 4.3 % — HIGH (ref 0–1.5)
INR BLD: 1.11 RATIO — SIGNIFICANT CHANGE UP (ref 0.88–1.16)
INR BLD: 1.27 RATIO — HIGH (ref 0.88–1.16)
LACTATE SERPL-SCNC: 2 MMOL/L — SIGNIFICANT CHANGE UP (ref 0.7–2)
LDH SERPL L TO P-CCNC: 2334 U/L — HIGH (ref 50–242)
LDH SERPL L TO P-CCNC: 670 U/L — HIGH (ref 50–242)
LDH SERPL L TO P-CCNC: 704 U/L — HIGH (ref 50–242)
LDH SERPL L TO P-CCNC: 714 U/L — HIGH (ref 50–242)
LDH SERPL L TO P-CCNC: 756 U/L — HIGH (ref 50–242)
LDH SERPL L TO P-CCNC: 918 U/L — HIGH (ref 50–242)
LIPID PNL WITH DIRECT LDL SERPL: 65 MG/DL — SIGNIFICANT CHANGE UP
LYMPHOCYTES # BLD AUTO: 0.29 K/UL — LOW (ref 1–3.3)
LYMPHOCYTES # BLD AUTO: 0.4 K/UL — LOW (ref 1–3.3)
LYMPHOCYTES # BLD AUTO: 0.7 K/UL — LOW (ref 1–3.3)
LYMPHOCYTES # BLD AUTO: 0.99 K/UL — LOW (ref 1–3.3)
LYMPHOCYTES # BLD AUTO: 2 % — LOW (ref 13–44)
LYMPHOCYTES # BLD AUTO: 2.4 % — LOW (ref 13–44)
LYMPHOCYTES # BLD AUTO: 3.5 % — LOW (ref 13–44)
LYMPHOCYTES # BLD AUTO: 9.8 % — LOW (ref 13–44)
MAGNESIUM SERPL-MCNC: 2.6 MG/DL — SIGNIFICANT CHANGE UP (ref 1.6–2.6)
MAGNESIUM SERPL-MCNC: 2.7 MG/DL — HIGH (ref 1.6–2.6)
MANUAL SMEAR VERIFICATION: SIGNIFICANT CHANGE UP
MCHC RBC-ENTMCNC: 21 PG — LOW (ref 27–34)
MCHC RBC-ENTMCNC: 21.2 PG — LOW (ref 27–34)
MCHC RBC-ENTMCNC: 21.3 PG — LOW (ref 27–34)
MCHC RBC-ENTMCNC: 21.3 PG — LOW (ref 27–34)
MCHC RBC-ENTMCNC: 21.4 PG — LOW (ref 27–34)
MCHC RBC-ENTMCNC: 21.5 PG — LOW (ref 27–34)
MCHC RBC-ENTMCNC: 21.5 PG — LOW (ref 27–34)
MCHC RBC-ENTMCNC: 28.3 GM/DL — LOW (ref 32–36)
MCHC RBC-ENTMCNC: 29.9 GM/DL — LOW (ref 32–36)
MCHC RBC-ENTMCNC: 30.2 GM/DL — LOW (ref 32–36)
MCHC RBC-ENTMCNC: 30.3 GM/DL — LOW (ref 32–36)
MCHC RBC-ENTMCNC: 30.4 GM/DL — LOW (ref 32–36)
MCHC RBC-ENTMCNC: 30.5 GM/DL — LOW (ref 32–36)
MCHC RBC-ENTMCNC: 30.5 GM/DL — LOW (ref 32–36)
MCHC RBC-ENTMCNC: 30.8 GM/DL — LOW (ref 32–36)
MCHC RBC-ENTMCNC: 31.3 GM/DL — LOW (ref 32–36)
MCHC RBC-ENTMCNC: 31.6 GM/DL — LOW (ref 32–36)
MCV RBC AUTO: 67.8 FL — LOW (ref 80–100)
MCV RBC AUTO: 68.1 FL — LOW (ref 80–100)
MCV RBC AUTO: 69.5 FL — LOW (ref 80–100)
MCV RBC AUTO: 69.7 FL — LOW (ref 80–100)
MCV RBC AUTO: 70 FL — LOW (ref 80–100)
MCV RBC AUTO: 70.6 FL — LOW (ref 80–100)
MCV RBC AUTO: 70.6 FL — LOW (ref 80–100)
MCV RBC AUTO: 70.9 FL — LOW (ref 80–100)
MCV RBC AUTO: 71.6 FL — LOW (ref 80–100)
MCV RBC AUTO: 74 FL — LOW (ref 80–100)
MICROCYTES BLD QL: SLIGHT — SIGNIFICANT CHANGE UP
MONOCYTES # BLD AUTO: 0.06 K/UL — SIGNIFICANT CHANGE UP (ref 0–0.9)
MONOCYTES # BLD AUTO: 0.19 K/UL — SIGNIFICANT CHANGE UP (ref 0–0.9)
MONOCYTES # BLD AUTO: 0.29 K/UL — SIGNIFICANT CHANGE UP (ref 0–0.9)
MONOCYTES # BLD AUTO: 0.31 K/UL — SIGNIFICANT CHANGE UP (ref 0–0.9)
MONOCYTES NFR BLD AUTO: 0.6 % — LOW (ref 2–14)
MONOCYTES NFR BLD AUTO: 1.2 % — LOW (ref 2–14)
MONOCYTES NFR BLD AUTO: 1.6 % — LOW (ref 2–14)
MONOCYTES NFR BLD AUTO: 2 % — SIGNIFICANT CHANGE UP (ref 2–14)
NEUTROPHILS # BLD AUTO: 13.81 K/UL — HIGH (ref 1.8–7.4)
NEUTROPHILS # BLD AUTO: 15.68 K/UL — HIGH (ref 1.8–7.4)
NEUTROPHILS # BLD AUTO: 17.84 K/UL — HIGH (ref 1.8–7.4)
NEUTROPHILS # BLD AUTO: 8.95 K/UL — HIGH (ref 1.8–7.4)
NEUTROPHILS NFR BLD AUTO: 88.8 % — HIGH (ref 43–77)
NEUTROPHILS NFR BLD AUTO: 90.5 % — HIGH (ref 43–77)
NEUTROPHILS NFR BLD AUTO: 95.9 % — HIGH (ref 43–77)
NEUTROPHILS NFR BLD AUTO: 96 % — HIGH (ref 43–77)
NRBC # BLD: 0 /100 WBCS — SIGNIFICANT CHANGE UP (ref 0–0)
NRBC # BLD: 0 /100 — SIGNIFICANT CHANGE UP (ref 0–0)
NRBC # BLD: SIGNIFICANT CHANGE UP /100 WBCS (ref 0–0)
NT-PROBNP SERPL-SCNC: 288 PG/ML — SIGNIFICANT CHANGE UP (ref 0–450)
PCO2 BLDA: 26 MMHG — LOW (ref 32–46)
PCO2 BLDA: 39 MMHG — SIGNIFICANT CHANGE UP (ref 32–46)
PH BLD: 7.37 — SIGNIFICANT CHANGE UP (ref 7.35–7.45)
PH BLD: 7.4 — SIGNIFICANT CHANGE UP (ref 7.35–7.45)
PHOSPHATE SERPL-MCNC: 1.8 MG/DL — LOW (ref 2.5–4.5)
PHOSPHATE SERPL-MCNC: 5 MG/DL — HIGH (ref 2.5–4.5)
PLAT MORPH BLD: NORMAL — SIGNIFICANT CHANGE UP
PLATELET # BLD AUTO: 127 K/UL — LOW (ref 150–400)
PLATELET # BLD AUTO: 222 K/UL — SIGNIFICANT CHANGE UP (ref 150–400)
PLATELET # BLD AUTO: 235 K/UL — SIGNIFICANT CHANGE UP (ref 150–400)
PLATELET # BLD AUTO: 248 K/UL — SIGNIFICANT CHANGE UP (ref 150–400)
PLATELET # BLD AUTO: 296 K/UL — SIGNIFICANT CHANGE UP (ref 150–400)
PLATELET # BLD AUTO: 327 K/UL — SIGNIFICANT CHANGE UP (ref 150–400)
PLATELET # BLD AUTO: 331 K/UL — SIGNIFICANT CHANGE UP (ref 150–400)
PLATELET # BLD AUTO: 340 K/UL — SIGNIFICANT CHANGE UP (ref 150–400)
PLATELET # BLD AUTO: 350 K/UL — SIGNIFICANT CHANGE UP (ref 150–400)
PLATELET # BLD AUTO: 376 K/UL — SIGNIFICANT CHANGE UP (ref 150–400)
PO2 BLDA: 175 MMHG — HIGH (ref 74–108)
PO2 BLDA: 55 MMHG — LOW (ref 74–108)
POTASSIUM SERPL-MCNC: 4.2 MMOL/L — SIGNIFICANT CHANGE UP (ref 3.5–5.3)
POTASSIUM SERPL-MCNC: 4.3 MMOL/L — SIGNIFICANT CHANGE UP (ref 3.5–5.3)
POTASSIUM SERPL-MCNC: 4.5 MMOL/L — SIGNIFICANT CHANGE UP (ref 3.5–5.3)
POTASSIUM SERPL-MCNC: 4.8 MMOL/L — SIGNIFICANT CHANGE UP (ref 3.5–5.3)
POTASSIUM SERPL-MCNC: 4.8 MMOL/L — SIGNIFICANT CHANGE UP (ref 3.5–5.3)
POTASSIUM SERPL-MCNC: 4.9 MMOL/L — SIGNIFICANT CHANGE UP (ref 3.5–5.3)
POTASSIUM SERPL-MCNC: 5.1 MMOL/L — SIGNIFICANT CHANGE UP (ref 3.5–5.3)
POTASSIUM SERPL-MCNC: 5.2 MMOL/L — SIGNIFICANT CHANGE UP (ref 3.5–5.3)
POTASSIUM SERPL-SCNC: 4.2 MMOL/L — SIGNIFICANT CHANGE UP (ref 3.5–5.3)
POTASSIUM SERPL-SCNC: 4.3 MMOL/L — SIGNIFICANT CHANGE UP (ref 3.5–5.3)
POTASSIUM SERPL-SCNC: 4.5 MMOL/L — SIGNIFICANT CHANGE UP (ref 3.5–5.3)
POTASSIUM SERPL-SCNC: 4.8 MMOL/L — SIGNIFICANT CHANGE UP (ref 3.5–5.3)
POTASSIUM SERPL-SCNC: 4.8 MMOL/L — SIGNIFICANT CHANGE UP (ref 3.5–5.3)
POTASSIUM SERPL-SCNC: 4.9 MMOL/L — SIGNIFICANT CHANGE UP (ref 3.5–5.3)
POTASSIUM SERPL-SCNC: 5.1 MMOL/L — SIGNIFICANT CHANGE UP (ref 3.5–5.3)
POTASSIUM SERPL-SCNC: 5.2 MMOL/L — SIGNIFICANT CHANGE UP (ref 3.5–5.3)
PROCALCITONIN SERPL-MCNC: 0.46 NG/ML — HIGH (ref 0.02–0.1)
PROCALCITONIN SERPL-MCNC: 2.66 NG/ML — HIGH (ref 0.02–0.1)
PROT SERPL-MCNC: 7 GM/DL — SIGNIFICANT CHANGE UP (ref 6–8.3)
PROT SERPL-MCNC: 7.1 GM/DL — SIGNIFICANT CHANGE UP (ref 6–8.3)
PROT SERPL-MCNC: 7.3 GM/DL — SIGNIFICANT CHANGE UP (ref 6–8.3)
PROT SERPL-MCNC: 7.3 GM/DL — SIGNIFICANT CHANGE UP (ref 6–8.3)
PROTHROM AB SERPL-ACNC: 12.5 SEC — SIGNIFICANT CHANGE UP (ref 10–12.9)
PROTHROM AB SERPL-ACNC: 14.3 SEC — HIGH (ref 10–12.9)
RBC # BLD: 4.23 M/UL — SIGNIFICANT CHANGE UP (ref 3.8–5.2)
RBC # BLD: 4.34 M/UL — SIGNIFICANT CHANGE UP (ref 3.8–5.2)
RBC # BLD: 4.43 M/UL — SIGNIFICANT CHANGE UP (ref 3.8–5.2)
RBC # BLD: 4.56 M/UL — SIGNIFICANT CHANGE UP (ref 3.8–5.2)
RBC # BLD: 4.74 M/UL — SIGNIFICANT CHANGE UP (ref 3.8–5.2)
RBC # BLD: 4.86 M/UL — SIGNIFICANT CHANGE UP (ref 3.8–5.2)
RBC # BLD: 4.87 M/UL — SIGNIFICANT CHANGE UP (ref 3.8–5.2)
RBC # BLD: 4.91 M/UL — SIGNIFICANT CHANGE UP (ref 3.8–5.2)
RBC # BLD: 5 M/UL — SIGNIFICANT CHANGE UP (ref 3.8–5.2)
RBC # BLD: 5.06 M/UL — SIGNIFICANT CHANGE UP (ref 3.8–5.2)
RBC # FLD: 17 % — HIGH (ref 10.3–14.5)
RBC # FLD: 17.3 % — HIGH (ref 10.3–14.5)
RBC # FLD: 17.4 % — HIGH (ref 10.3–14.5)
RBC # FLD: 17.4 % — HIGH (ref 10.3–14.5)
RBC # FLD: 17.5 % — HIGH (ref 10.3–14.5)
RBC # FLD: 17.7 % — HIGH (ref 10.3–14.5)
RBC # FLD: 17.8 % — HIGH (ref 10.3–14.5)
RBC # FLD: 17.8 % — HIGH (ref 10.3–14.5)
RBC # FLD: 18.3 % — HIGH (ref 10.3–14.5)
RBC # FLD: 18.6 % — HIGH (ref 10.3–14.5)
RBC BLD AUTO: SIGNIFICANT CHANGE UP
SAO2 % BLDA: 88 % — LOW (ref 92–96)
SAO2 % BLDA: 99 % — HIGH (ref 92–96)
SARS-COV-2 RNA SPEC QL NAA+PROBE: DETECTED
SODIUM SERPL-SCNC: 128 MMOL/L — LOW (ref 135–145)
SODIUM SERPL-SCNC: 129 MMOL/L — LOW (ref 135–145)
SODIUM SERPL-SCNC: 131 MMOL/L — LOW (ref 135–145)
SODIUM SERPL-SCNC: 134 MMOL/L — LOW (ref 135–145)
SODIUM SERPL-SCNC: 139 MMOL/L — SIGNIFICANT CHANGE UP (ref 135–145)
SODIUM SERPL-SCNC: 142 MMOL/L — SIGNIFICANT CHANGE UP (ref 135–145)
SODIUM SERPL-SCNC: 151 MMOL/L — HIGH (ref 135–145)
SODIUM SERPL-SCNC: 153 MMOL/L — HIGH (ref 135–145)
TOTAL CHOLESTEROL/HDL RATIO MEASUREMENT: 6.9 RATIO — SIGNIFICANT CHANGE UP (ref 3.3–7.1)
TRIGL SERPL-MCNC: 286 MG/DL — HIGH (ref 10–149)
TROPONIN I SERPL-MCNC: 2.84 NG/ML — HIGH (ref 0.01–0.04)
TROPONIN I SERPL-MCNC: 4.88 NG/ML — HIGH (ref 0.01–0.04)
TROPONIN I SERPL-MCNC: 6.46 NG/ML — HIGH (ref 0.01–0.04)
TROPONIN I SERPL-MCNC: 6.52 NG/ML — HIGH (ref 0.01–0.04)
TROPONIN I SERPL-MCNC: <.015 NG/ML — SIGNIFICANT CHANGE UP (ref 0.01–0.04)
WBC # BLD: 10.08 K/UL — SIGNIFICANT CHANGE UP (ref 3.8–10.5)
WBC # BLD: 14.04 K/UL — HIGH (ref 3.8–10.5)
WBC # BLD: 14.39 K/UL — HIGH (ref 3.8–10.5)
WBC # BLD: 14.55 K/UL — HIGH (ref 3.8–10.5)
WBC # BLD: 15.2 K/UL — HIGH (ref 3.8–10.5)
WBC # BLD: 16.35 K/UL — HIGH (ref 3.8–10.5)
WBC # BLD: 16.97 K/UL — HIGH (ref 3.8–10.5)
WBC # BLD: 18.26 K/UL — HIGH (ref 3.8–10.5)
WBC # BLD: 19.72 K/UL — HIGH (ref 3.8–10.5)
WBC # BLD: 20.46 K/UL — HIGH (ref 3.8–10.5)
WBC # FLD AUTO: 10.08 K/UL — SIGNIFICANT CHANGE UP (ref 3.8–10.5)
WBC # FLD AUTO: 14.04 K/UL — HIGH (ref 3.8–10.5)
WBC # FLD AUTO: 14.39 K/UL — HIGH (ref 3.8–10.5)
WBC # FLD AUTO: 14.55 K/UL — HIGH (ref 3.8–10.5)
WBC # FLD AUTO: 15.2 K/UL — HIGH (ref 3.8–10.5)
WBC # FLD AUTO: 16.35 K/UL — HIGH (ref 3.8–10.5)
WBC # FLD AUTO: 16.97 K/UL — HIGH (ref 3.8–10.5)
WBC # FLD AUTO: 18.26 K/UL — HIGH (ref 3.8–10.5)
WBC # FLD AUTO: 19.72 K/UL — HIGH (ref 3.8–10.5)
WBC # FLD AUTO: 20.46 K/UL — HIGH (ref 3.8–10.5)

## 2020-01-01 PROCEDURE — 99233 SBSQ HOSP IP/OBS HIGH 50: CPT

## 2020-01-01 PROCEDURE — 71045 X-RAY EXAM CHEST 1 VIEW: CPT | Mod: 26

## 2020-01-01 PROCEDURE — 99285 EMERGENCY DEPT VISIT HI MDM: CPT

## 2020-01-01 PROCEDURE — 31500 INSERT EMERGENCY AIRWAY: CPT

## 2020-01-01 PROCEDURE — 36556 INSERT NON-TUNNEL CV CATH: CPT

## 2020-01-01 PROCEDURE — 99291 CRITICAL CARE FIRST HOUR: CPT | Mod: 25

## 2020-01-01 PROCEDURE — 93010 ELECTROCARDIOGRAM REPORT: CPT

## 2020-01-01 PROCEDURE — 99238 HOSP IP/OBS DSCHRG MGMT 30/<: CPT

## 2020-01-01 PROCEDURE — 99222 1ST HOSP IP/OBS MODERATE 55: CPT

## 2020-01-01 RX ORDER — HYDROXYCHLOROQUINE SULFATE 200 MG
200 TABLET ORAL EVERY 12 HOURS
Refills: 0 | Status: COMPLETED | OUTPATIENT
Start: 2020-01-01 | End: 2020-01-01

## 2020-01-01 RX ORDER — SITAGLIPTIN AND METFORMIN HYDROCHLORIDE 500; 50 MG/1; MG/1
1 TABLET, FILM COATED ORAL
Qty: 0 | Refills: 0 | DISCHARGE

## 2020-01-01 RX ORDER — MIDAZOLAM HYDROCHLORIDE 1 MG/ML
0.02 INJECTION, SOLUTION INTRAMUSCULAR; INTRAVENOUS
Qty: 100 | Refills: 0 | Status: DISCONTINUED | OUTPATIENT
Start: 2020-01-01 | End: 2020-01-01

## 2020-01-01 RX ORDER — INSULIN GLARGINE 100 [IU]/ML
20 INJECTION, SOLUTION SUBCUTANEOUS AT BEDTIME
Refills: 0 | Status: DISCONTINUED | OUTPATIENT
Start: 2020-01-01 | End: 2020-01-01

## 2020-01-01 RX ORDER — NOREPINEPHRINE BITARTRATE/D5W 8 MG/250ML
0.05 PLASTIC BAG, INJECTION (ML) INTRAVENOUS
Qty: 8 | Refills: 0 | Status: DISCONTINUED | OUTPATIENT
Start: 2020-01-01 | End: 2020-01-01

## 2020-01-01 RX ORDER — FUROSEMIDE 40 MG
40 TABLET ORAL ONCE
Refills: 0 | Status: COMPLETED | OUTPATIENT
Start: 2020-01-01 | End: 2020-01-01

## 2020-01-01 RX ORDER — AMLODIPINE BESYLATE 2.5 MG/1
1 TABLET ORAL
Qty: 0 | Refills: 0 | DISCHARGE

## 2020-01-01 RX ORDER — INSULIN LISPRO 100/ML
VIAL (ML) SUBCUTANEOUS
Refills: 0 | Status: DISCONTINUED | OUTPATIENT
Start: 2020-01-01 | End: 2020-01-01

## 2020-01-01 RX ORDER — NOREPINEPHRINE BITARTRATE/D5W 8 MG/250ML
0.05 PLASTIC BAG, INJECTION (ML) INTRAVENOUS
Qty: 16 | Refills: 0 | Status: DISCONTINUED | OUTPATIENT
Start: 2020-01-01 | End: 2020-01-01

## 2020-01-01 RX ORDER — CHLORHEXIDINE GLUCONATE 213 G/1000ML
1 SOLUTION TOPICAL
Refills: 0 | Status: DISCONTINUED | OUTPATIENT
Start: 2020-01-01 | End: 2020-01-01

## 2020-01-01 RX ORDER — ASCORBIC ACID 60 MG
1000 TABLET,CHEWABLE ORAL
Refills: 0 | Status: DISCONTINUED | OUTPATIENT
Start: 2020-01-01 | End: 2020-01-01

## 2020-01-01 RX ORDER — HEPARIN SODIUM 5000 [USP'U]/ML
INJECTION INTRAVENOUS; SUBCUTANEOUS
Qty: 25000 | Refills: 0 | Status: DISCONTINUED | OUTPATIENT
Start: 2020-01-01 | End: 2020-01-01

## 2020-01-01 RX ORDER — LABETALOL HCL 100 MG
10 TABLET ORAL ONCE
Refills: 0 | Status: COMPLETED | OUTPATIENT
Start: 2020-01-01 | End: 2020-01-01

## 2020-01-01 RX ORDER — GABAPENTIN 400 MG/1
200 CAPSULE ORAL
Qty: 0 | Refills: 0 | DISCHARGE

## 2020-01-01 RX ORDER — SODIUM CHLORIDE 9 MG/ML
1000 INJECTION INTRAMUSCULAR; INTRAVENOUS; SUBCUTANEOUS ONCE
Refills: 0 | Status: COMPLETED | OUTPATIENT
Start: 2020-01-01 | End: 2020-01-01

## 2020-01-01 RX ORDER — MULTIVIT-MIN/FERROUS GLUCONATE 9 MG/15 ML
1 LIQUID (ML) ORAL
Qty: 0 | Refills: 0 | DISCHARGE

## 2020-01-01 RX ORDER — MAGNESIUM HYDROXIDE 400 MG/1
30 TABLET, CHEWABLE ORAL DAILY
Refills: 0 | Status: DISCONTINUED | OUTPATIENT
Start: 2020-01-01 | End: 2020-01-01

## 2020-01-01 RX ORDER — OLOPATADINE HYDROCHLORIDE 1 MG/ML
1 SOLUTION/ DROPS OPHTHALMIC
Qty: 0 | Refills: 0 | DISCHARGE

## 2020-01-01 RX ORDER — ENOXAPARIN SODIUM 100 MG/ML
30 INJECTION SUBCUTANEOUS
Refills: 0 | Status: DISCONTINUED | OUTPATIENT
Start: 2020-01-01 | End: 2020-01-01

## 2020-01-01 RX ORDER — METOPROLOL TARTRATE 50 MG
5 TABLET ORAL EVERY 6 HOURS
Refills: 0 | Status: DISCONTINUED | OUTPATIENT
Start: 2020-01-01 | End: 2020-01-01

## 2020-01-01 RX ORDER — ACETAMINOPHEN 500 MG
1000 TABLET ORAL ONCE
Refills: 0 | Status: COMPLETED | OUTPATIENT
Start: 2020-01-01 | End: 2020-01-01

## 2020-01-01 RX ORDER — INSULIN NPH HUM/REG INSULIN HM 70-30/ML
0 VIAL (ML) SUBCUTANEOUS
Qty: 0 | Refills: 0 | DISCHARGE

## 2020-01-01 RX ORDER — PROPOFOL 10 MG/ML
30.07 INJECTION, EMULSION INTRAVENOUS
Qty: 1000 | Refills: 0 | Status: DISCONTINUED | OUTPATIENT
Start: 2020-01-01 | End: 2020-01-01

## 2020-01-01 RX ORDER — DEXTROSE 50 % IN WATER 50 %
25 SYRINGE (ML) INTRAVENOUS ONCE
Refills: 0 | Status: DISCONTINUED | OUTPATIENT
Start: 2020-01-01 | End: 2020-01-01

## 2020-01-01 RX ORDER — DEXLANSOPRAZOLE 30 MG/1
1 CAPSULE, DELAYED RELEASE ORAL
Qty: 0 | Refills: 0 | DISCHARGE

## 2020-01-01 RX ORDER — ALPRAZOLAM 0.25 MG
0.25 TABLET ORAL ONCE
Refills: 0 | Status: DISCONTINUED | OUTPATIENT
Start: 2020-01-01 | End: 2020-01-01

## 2020-01-01 RX ORDER — SODIUM CHLORIDE 9 MG/ML
1000 INJECTION, SOLUTION INTRAVENOUS
Refills: 0 | Status: DISCONTINUED | OUTPATIENT
Start: 2020-01-01 | End: 2020-01-01

## 2020-01-01 RX ORDER — ASPIRIN/CALCIUM CARB/MAGNESIUM 324 MG
1 TABLET ORAL
Qty: 0 | Refills: 0 | DISCHARGE

## 2020-01-01 RX ORDER — HYDROXYCHLOROQUINE SULFATE 200 MG
TABLET ORAL
Refills: 0 | Status: COMPLETED | OUTPATIENT
Start: 2020-01-01 | End: 2020-01-01

## 2020-01-01 RX ORDER — LISINOPRIL 2.5 MG/1
20 TABLET ORAL DAILY
Refills: 0 | Status: DISCONTINUED | OUTPATIENT
Start: 2020-01-01 | End: 2020-01-01

## 2020-01-01 RX ORDER — METOPROLOL TARTRATE 50 MG
2.5 TABLET ORAL ONCE
Refills: 0 | Status: COMPLETED | OUTPATIENT
Start: 2020-01-01 | End: 2020-01-01

## 2020-01-01 RX ORDER — ASCORBIC ACID 60 MG
1 TABLET,CHEWABLE ORAL
Qty: 0 | Refills: 0 | DISCHARGE

## 2020-01-01 RX ORDER — FOLIC ACID 0.8 MG
1 TABLET ORAL
Qty: 0 | Refills: 0 | DISCHARGE

## 2020-01-01 RX ORDER — NYSTATIN 500MM UNIT
500000 POWDER (EA) MISCELLANEOUS
Refills: 0 | Status: DISCONTINUED | OUTPATIENT
Start: 2020-01-01 | End: 2020-01-01

## 2020-01-01 RX ORDER — PROPOFOL 10 MG/ML
30 INJECTION, EMULSION INTRAVENOUS
Qty: 1000 | Refills: 0 | Status: DISCONTINUED | OUTPATIENT
Start: 2020-01-01 | End: 2020-01-01

## 2020-01-01 RX ORDER — ATORVASTATIN CALCIUM 80 MG/1
1 TABLET, FILM COATED ORAL
Qty: 0 | Refills: 0 | DISCHARGE

## 2020-01-01 RX ORDER — PREGABALIN 225 MG/1
1 CAPSULE ORAL
Qty: 0 | Refills: 0 | DISCHARGE

## 2020-01-01 RX ORDER — POTASSIUM PHOSPHATE, MONOBASIC POTASSIUM PHOSPHATE, DIBASIC 236; 224 MG/ML; MG/ML
15 INJECTION, SOLUTION INTRAVENOUS ONCE
Refills: 0 | Status: COMPLETED | OUTPATIENT
Start: 2020-01-01 | End: 2020-01-01

## 2020-01-01 RX ORDER — HEPARIN SODIUM 5000 [USP'U]/ML
4400 INJECTION INTRAVENOUS; SUBCUTANEOUS EVERY 6 HOURS
Refills: 0 | Status: DISCONTINUED | OUTPATIENT
Start: 2020-01-01 | End: 2020-01-01

## 2020-01-01 RX ORDER — ISOSORBIDE DINITRATE 5 MG/1
1 TABLET ORAL
Qty: 0 | Refills: 0 | DISCHARGE

## 2020-01-01 RX ORDER — DEXTROSE 50 % IN WATER 50 %
12.5 SYRINGE (ML) INTRAVENOUS ONCE
Refills: 0 | Status: DISCONTINUED | OUTPATIENT
Start: 2020-01-01 | End: 2020-01-01

## 2020-01-01 RX ORDER — METOPROLOL TARTRATE 50 MG
25 TABLET ORAL ONCE
Refills: 0 | Status: DISCONTINUED | OUTPATIENT
Start: 2020-01-01 | End: 2020-01-01

## 2020-01-01 RX ORDER — CLONAZEPAM 1 MG
0 TABLET ORAL
Qty: 0 | Refills: 0 | DISCHARGE

## 2020-01-01 RX ORDER — LISINOPRIL 2.5 MG/1
1 TABLET ORAL
Qty: 0 | Refills: 0 | DISCHARGE

## 2020-01-01 RX ORDER — DESLORATADINE 5 MG/1
1 TABLET, FILM COATED ORAL
Qty: 0 | Refills: 0 | DISCHARGE

## 2020-01-01 RX ORDER — GLUCAGON INJECTION, SOLUTION 0.5 MG/.1ML
1 INJECTION, SOLUTION SUBCUTANEOUS ONCE
Refills: 0 | Status: DISCONTINUED | OUTPATIENT
Start: 2020-01-01 | End: 2020-01-01

## 2020-01-01 RX ORDER — FENOFIBRIC ACID 105 MG/1
1 TABLET ORAL
Qty: 0 | Refills: 0 | DISCHARGE

## 2020-01-01 RX ORDER — ACETAMINOPHEN 500 MG
650 TABLET ORAL EVERY 6 HOURS
Refills: 0 | Status: DISCONTINUED | OUTPATIENT
Start: 2020-01-01 | End: 2020-01-01

## 2020-01-01 RX ORDER — SENNA PLUS 8.6 MG/1
2 TABLET ORAL AT BEDTIME
Refills: 0 | Status: DISCONTINUED | OUTPATIENT
Start: 2020-01-01 | End: 2020-01-01

## 2020-01-01 RX ORDER — TICAGRELOR 90 MG/1
60 TABLET ORAL EVERY 12 HOURS
Refills: 0 | Status: DISCONTINUED | OUTPATIENT
Start: 2020-01-01 | End: 2020-01-01

## 2020-01-01 RX ORDER — NITROGLYCERIN 6.5 MG
0.4 CAPSULE, EXTENDED RELEASE ORAL
Refills: 0 | Status: DISCONTINUED | OUTPATIENT
Start: 2020-01-01 | End: 2020-01-01

## 2020-01-01 RX ORDER — ATORVASTATIN CALCIUM 80 MG/1
20 TABLET, FILM COATED ORAL AT BEDTIME
Refills: 0 | Status: DISCONTINUED | OUTPATIENT
Start: 2020-01-01 | End: 2020-01-01

## 2020-01-01 RX ORDER — INSULIN GLARGINE 100 [IU]/ML
30 INJECTION, SOLUTION SUBCUTANEOUS AT BEDTIME
Refills: 0 | Status: DISCONTINUED | OUTPATIENT
Start: 2020-01-01 | End: 2020-01-01

## 2020-01-01 RX ORDER — AMLODIPINE BESYLATE 2.5 MG/1
5 TABLET ORAL DAILY
Refills: 0 | Status: DISCONTINUED | OUTPATIENT
Start: 2020-01-01 | End: 2020-01-01

## 2020-01-01 RX ORDER — TICAGRELOR 90 MG/1
1 TABLET ORAL
Qty: 0 | Refills: 0 | DISCHARGE

## 2020-01-01 RX ORDER — NADOLOL 80 MG/1
2 TABLET ORAL
Qty: 0 | Refills: 0 | DISCHARGE

## 2020-01-01 RX ORDER — HEPARIN SODIUM 5000 [USP'U]/ML
4400 INJECTION INTRAVENOUS; SUBCUTANEOUS ONCE
Refills: 0 | Status: COMPLETED | OUTPATIENT
Start: 2020-01-01 | End: 2020-01-01

## 2020-01-01 RX ORDER — ANAKINRA 100MG/0.67
100 SYRINGE (ML) SUBCUTANEOUS EVERY 6 HOURS
Refills: 0 | Status: DISCONTINUED | OUTPATIENT
Start: 2020-01-01 | End: 2020-01-01

## 2020-01-01 RX ORDER — SODIUM CHLORIDE 9 MG/ML
500 INJECTION INTRAMUSCULAR; INTRAVENOUS; SUBCUTANEOUS ONCE
Refills: 0 | Status: COMPLETED | OUTPATIENT
Start: 2020-01-01 | End: 2020-01-01

## 2020-01-01 RX ORDER — GABAPENTIN 400 MG/1
100 CAPSULE ORAL THREE TIMES A DAY
Refills: 0 | Status: DISCONTINUED | OUTPATIENT
Start: 2020-01-01 | End: 2020-01-01

## 2020-01-01 RX ORDER — PETROLATUM,WHITE
1 JELLY (GRAM) TOPICAL
Refills: 0 | Status: DISCONTINUED | OUTPATIENT
Start: 2020-01-01 | End: 2020-01-01

## 2020-01-01 RX ORDER — SODIUM CHLORIDE 9 MG/ML
1000 INJECTION INTRAMUSCULAR; INTRAVENOUS; SUBCUTANEOUS
Refills: 0 | Status: DISCONTINUED | OUTPATIENT
Start: 2020-01-01 | End: 2020-01-01

## 2020-01-01 RX ORDER — METOPROLOL TARTRATE 50 MG
25 TABLET ORAL
Refills: 0 | Status: DISCONTINUED | OUTPATIENT
Start: 2020-01-01 | End: 2020-01-01

## 2020-01-01 RX ORDER — ACETAMINOPHEN 500 MG
650 TABLET ORAL EVERY 4 HOURS
Refills: 0 | Status: DISCONTINUED | OUTPATIENT
Start: 2020-01-01 | End: 2020-01-01

## 2020-01-01 RX ORDER — ACETAMINOPHEN 500 MG
650 TABLET ORAL ONCE
Refills: 0 | Status: COMPLETED | OUTPATIENT
Start: 2020-01-01 | End: 2020-01-01

## 2020-01-01 RX ORDER — INSULIN LISPRO 100/ML
VIAL (ML) SUBCUTANEOUS AT BEDTIME
Refills: 0 | Status: DISCONTINUED | OUTPATIENT
Start: 2020-01-01 | End: 2020-01-01

## 2020-01-01 RX ORDER — ASPIRIN/CALCIUM CARB/MAGNESIUM 324 MG
81 TABLET ORAL DAILY
Refills: 0 | Status: DISCONTINUED | OUTPATIENT
Start: 2020-01-01 | End: 2020-01-01

## 2020-01-01 RX ORDER — CEFTRIAXONE 500 MG/1
1000 INJECTION, POWDER, FOR SOLUTION INTRAMUSCULAR; INTRAVENOUS EVERY 24 HOURS
Refills: 0 | Status: DISCONTINUED | OUTPATIENT
Start: 2020-01-01 | End: 2020-01-01

## 2020-01-01 RX ORDER — ZINC SULFATE TAB 220 MG (50 MG ZINC EQUIVALENT) 220 (50 ZN) MG
220 TAB ORAL DAILY
Refills: 0 | Status: DISCONTINUED | OUTPATIENT
Start: 2020-01-01 | End: 2020-01-01

## 2020-01-01 RX ORDER — DEXTROSE 50 % IN WATER 50 %
15 SYRINGE (ML) INTRAVENOUS ONCE
Refills: 0 | Status: DISCONTINUED | OUTPATIENT
Start: 2020-01-01 | End: 2020-01-01

## 2020-01-01 RX ORDER — AMLODIPINE BESYLATE 2.5 MG/1
10 TABLET ORAL DAILY
Refills: 0 | Status: DISCONTINUED | OUTPATIENT
Start: 2020-01-01 | End: 2020-01-01

## 2020-01-01 RX ORDER — HYDROXYCHLOROQUINE SULFATE 200 MG
400 TABLET ORAL EVERY 12 HOURS
Refills: 0 | Status: COMPLETED | OUTPATIENT
Start: 2020-01-01 | End: 2020-01-01

## 2020-01-01 RX ADMIN — GABAPENTIN 100 MILLIGRAM(S): 400 CAPSULE ORAL at 22:05

## 2020-01-01 RX ADMIN — TICAGRELOR 60 MILLIGRAM(S): 90 TABLET ORAL at 06:10

## 2020-01-01 RX ADMIN — Medication 81 MILLIGRAM(S): at 11:55

## 2020-01-01 RX ADMIN — Medication 81 MILLIGRAM(S): at 16:57

## 2020-01-01 RX ADMIN — ZINC SULFATE TAB 220 MG (50 MG ZINC EQUIVALENT) 220 MILLIGRAM(S): 220 (50 ZN) TAB at 12:30

## 2020-01-01 RX ADMIN — ENOXAPARIN SODIUM 30 MILLIGRAM(S): 100 INJECTION SUBCUTANEOUS at 05:41

## 2020-01-01 RX ADMIN — TICAGRELOR 60 MILLIGRAM(S): 90 TABLET ORAL at 17:31

## 2020-01-01 RX ADMIN — ENOXAPARIN SODIUM 30 MILLIGRAM(S): 100 INJECTION SUBCUTANEOUS at 17:57

## 2020-01-01 RX ADMIN — ZINC SULFATE TAB 220 MG (50 MG ZINC EQUIVALENT) 220 MILLIGRAM(S): 220 (50 ZN) TAB at 14:01

## 2020-01-01 RX ADMIN — ATORVASTATIN CALCIUM 20 MILLIGRAM(S): 80 TABLET, FILM COATED ORAL at 22:01

## 2020-01-01 RX ADMIN — LISINOPRIL 20 MILLIGRAM(S): 2.5 TABLET ORAL at 05:46

## 2020-01-01 RX ADMIN — Medication 2: at 23:27

## 2020-01-01 RX ADMIN — Medication 2.5 MILLIGRAM(S): at 15:12

## 2020-01-01 RX ADMIN — Medication 4: at 16:40

## 2020-01-01 RX ADMIN — Medication 400 MILLIGRAM(S): at 15:30

## 2020-01-01 RX ADMIN — GABAPENTIN 100 MILLIGRAM(S): 400 CAPSULE ORAL at 06:26

## 2020-01-01 RX ADMIN — Medication 200 MILLIGRAM(S): at 23:28

## 2020-01-01 RX ADMIN — Medication 650 MILLIGRAM(S): at 18:43

## 2020-01-01 RX ADMIN — AMLODIPINE BESYLATE 5 MILLIGRAM(S): 2.5 TABLET ORAL at 15:12

## 2020-01-01 RX ADMIN — ENOXAPARIN SODIUM 30 MILLIGRAM(S): 100 INJECTION SUBCUTANEOUS at 18:42

## 2020-01-01 RX ADMIN — Medication 200 MILLIGRAM(S): at 15:27

## 2020-01-01 RX ADMIN — TICAGRELOR 60 MILLIGRAM(S): 90 TABLET ORAL at 05:46

## 2020-01-01 RX ADMIN — SENNA PLUS 2 TABLET(S): 8.6 TABLET ORAL at 22:05

## 2020-01-01 RX ADMIN — Medication 81 MILLIGRAM(S): at 12:11

## 2020-01-01 RX ADMIN — Medication 1 APPLICATION(S): at 06:11

## 2020-01-01 RX ADMIN — Medication 100 MILLIGRAM(S): at 20:09

## 2020-01-01 RX ADMIN — PROPOFOL 13.1 MICROGRAM(S)/KG/MIN: 10 INJECTION, EMULSION INTRAVENOUS at 02:17

## 2020-01-01 RX ADMIN — TICAGRELOR 60 MILLIGRAM(S): 90 TABLET ORAL at 16:57

## 2020-01-01 RX ADMIN — Medication 25 MILLIGRAM(S): at 17:28

## 2020-01-01 RX ADMIN — TICAGRELOR 60 MILLIGRAM(S): 90 TABLET ORAL at 06:27

## 2020-01-01 RX ADMIN — GABAPENTIN 100 MILLIGRAM(S): 400 CAPSULE ORAL at 21:52

## 2020-01-01 RX ADMIN — Medication 500000 UNIT(S): at 18:29

## 2020-01-01 RX ADMIN — HEPARIN SODIUM 500 UNIT(S)/HR: 5000 INJECTION INTRAVENOUS; SUBCUTANEOUS at 12:27

## 2020-01-01 RX ADMIN — Medication 81 MILLIGRAM(S): at 15:12

## 2020-01-01 RX ADMIN — SENNA PLUS 2 TABLET(S): 8.6 TABLET ORAL at 21:47

## 2020-01-01 RX ADMIN — Medication 400 MILLIGRAM(S): at 14:01

## 2020-01-01 RX ADMIN — CEFTRIAXONE 100 MILLIGRAM(S): 500 INJECTION, POWDER, FOR SOLUTION INTRAMUSCULAR; INTRAVENOUS at 10:08

## 2020-01-01 RX ADMIN — Medication 81 MILLIGRAM(S): at 11:44

## 2020-01-01 RX ADMIN — Medication 650 MILLIGRAM(S): at 01:45

## 2020-01-01 RX ADMIN — TICAGRELOR 60 MILLIGRAM(S): 90 TABLET ORAL at 18:41

## 2020-01-01 RX ADMIN — Medication 4: at 12:11

## 2020-01-01 RX ADMIN — GABAPENTIN 100 MILLIGRAM(S): 400 CAPSULE ORAL at 15:12

## 2020-01-01 RX ADMIN — Medication 650 MILLIGRAM(S): at 08:41

## 2020-01-01 RX ADMIN — Medication 40 MILLIGRAM(S): at 05:47

## 2020-01-01 RX ADMIN — ZINC SULFATE TAB 220 MG (50 MG ZINC EQUIVALENT) 220 MILLIGRAM(S): 220 (50 ZN) TAB at 12:01

## 2020-01-01 RX ADMIN — INSULIN GLARGINE 30 UNIT(S): 100 INJECTION, SOLUTION SUBCUTANEOUS at 21:53

## 2020-01-01 RX ADMIN — Medication 500000 UNIT(S): at 13:22

## 2020-01-01 RX ADMIN — HEPARIN SODIUM 4400 UNIT(S): 5000 INJECTION INTRAVENOUS; SUBCUTANEOUS at 10:34

## 2020-01-01 RX ADMIN — Medication 0.4 MILLIGRAM(S): at 18:00

## 2020-01-01 RX ADMIN — GABAPENTIN 100 MILLIGRAM(S): 400 CAPSULE ORAL at 05:46

## 2020-01-01 RX ADMIN — Medication 40 MILLIGRAM(S): at 17:23

## 2020-01-01 RX ADMIN — Medication 0.25 MILLIGRAM(S): at 10:02

## 2020-01-01 RX ADMIN — Medication 500000 UNIT(S): at 17:18

## 2020-01-01 RX ADMIN — Medication 200 MILLIGRAM(S): at 02:19

## 2020-01-01 RX ADMIN — AMLODIPINE BESYLATE 5 MILLIGRAM(S): 2.5 TABLET ORAL at 06:10

## 2020-01-01 RX ADMIN — Medication 1000 MILLIGRAM(S): at 17:00

## 2020-01-01 RX ADMIN — Medication 500000 UNIT(S): at 06:12

## 2020-01-01 RX ADMIN — LISINOPRIL 20 MILLIGRAM(S): 2.5 TABLET ORAL at 06:10

## 2020-01-01 RX ADMIN — GABAPENTIN 100 MILLIGRAM(S): 400 CAPSULE ORAL at 13:22

## 2020-01-01 RX ADMIN — Medication 1 APPLICATION(S): at 05:39

## 2020-01-01 RX ADMIN — Medication 1 APPLICATION(S): at 18:00

## 2020-01-01 RX ADMIN — CEFTRIAXONE 100 MILLIGRAM(S): 500 INJECTION, POWDER, FOR SOLUTION INTRAMUSCULAR; INTRAVENOUS at 09:01

## 2020-01-01 RX ADMIN — Medication 0: at 22:04

## 2020-01-01 RX ADMIN — Medication 40 MILLIGRAM(S): at 01:40

## 2020-01-01 RX ADMIN — Medication 8: at 13:20

## 2020-01-01 RX ADMIN — ENOXAPARIN SODIUM 30 MILLIGRAM(S): 100 INJECTION SUBCUTANEOUS at 17:23

## 2020-01-01 RX ADMIN — Medication 5 MILLIGRAM(S): at 05:39

## 2020-01-01 RX ADMIN — Medication 81 MILLIGRAM(S): at 12:01

## 2020-01-01 RX ADMIN — ATORVASTATIN CALCIUM 20 MILLIGRAM(S): 80 TABLET, FILM COATED ORAL at 22:19

## 2020-01-01 RX ADMIN — GABAPENTIN 100 MILLIGRAM(S): 400 CAPSULE ORAL at 05:41

## 2020-01-01 RX ADMIN — Medication 81 MILLIGRAM(S): at 12:06

## 2020-01-01 RX ADMIN — ZINC SULFATE TAB 220 MG (50 MG ZINC EQUIVALENT) 220 MILLIGRAM(S): 220 (50 ZN) TAB at 12:12

## 2020-01-01 RX ADMIN — Medication 1000 MILLIGRAM(S): at 17:28

## 2020-01-01 RX ADMIN — ENOXAPARIN SODIUM 30 MILLIGRAM(S): 100 INJECTION SUBCUTANEOUS at 05:55

## 2020-01-01 RX ADMIN — Medication 8: at 16:59

## 2020-01-01 RX ADMIN — HEPARIN SODIUM 900 UNIT(S)/HR: 5000 INJECTION INTRAVENOUS; SUBCUTANEOUS at 10:20

## 2020-01-01 RX ADMIN — Medication 4: at 16:26

## 2020-01-01 RX ADMIN — Medication 650 MILLIGRAM(S): at 17:35

## 2020-01-01 RX ADMIN — SENNA PLUS 2 TABLET(S): 8.6 TABLET ORAL at 23:27

## 2020-01-01 RX ADMIN — ATORVASTATIN CALCIUM 20 MILLIGRAM(S): 80 TABLET, FILM COATED ORAL at 22:41

## 2020-01-01 RX ADMIN — INSULIN GLARGINE 20 UNIT(S): 100 INJECTION, SOLUTION SUBCUTANEOUS at 23:27

## 2020-01-01 RX ADMIN — Medication 500000 UNIT(S): at 15:12

## 2020-01-01 RX ADMIN — Medication 1000 MILLIGRAM(S): at 06:26

## 2020-01-01 RX ADMIN — Medication 200 MILLIGRAM(S): at 02:20

## 2020-01-01 RX ADMIN — ENOXAPARIN SODIUM 30 MILLIGRAM(S): 100 INJECTION SUBCUTANEOUS at 09:04

## 2020-01-01 RX ADMIN — TICAGRELOR 60 MILLIGRAM(S): 90 TABLET ORAL at 18:25

## 2020-01-01 RX ADMIN — GABAPENTIN 100 MILLIGRAM(S): 400 CAPSULE ORAL at 12:11

## 2020-01-01 RX ADMIN — Medication 400 MILLIGRAM(S): at 02:31

## 2020-01-01 RX ADMIN — GABAPENTIN 100 MILLIGRAM(S): 400 CAPSULE ORAL at 22:42

## 2020-01-01 RX ADMIN — SENNA PLUS 2 TABLET(S): 8.6 TABLET ORAL at 21:52

## 2020-01-01 RX ADMIN — Medication 100 MILLIGRAM(S): at 23:43

## 2020-01-01 RX ADMIN — Medication 8: at 09:52

## 2020-01-01 RX ADMIN — SODIUM CHLORIDE 50 MILLILITER(S): 9 INJECTION INTRAMUSCULAR; INTRAVENOUS; SUBCUTANEOUS at 15:13

## 2020-01-01 RX ADMIN — Medication 25 MILLIGRAM(S): at 06:26

## 2020-01-01 RX ADMIN — INSULIN GLARGINE 20 UNIT(S): 100 INJECTION, SOLUTION SUBCUTANEOUS at 22:00

## 2020-01-01 RX ADMIN — HEPARIN SODIUM 700 UNIT(S)/HR: 5000 INJECTION INTRAVENOUS; SUBCUTANEOUS at 23:52

## 2020-01-01 RX ADMIN — GABAPENTIN 100 MILLIGRAM(S): 400 CAPSULE ORAL at 14:56

## 2020-01-01 RX ADMIN — TICAGRELOR 60 MILLIGRAM(S): 90 TABLET ORAL at 09:33

## 2020-01-01 RX ADMIN — GABAPENTIN 100 MILLIGRAM(S): 400 CAPSULE ORAL at 06:11

## 2020-01-01 RX ADMIN — AMLODIPINE BESYLATE 5 MILLIGRAM(S): 2.5 TABLET ORAL at 05:41

## 2020-01-01 RX ADMIN — Medication 2: at 06:25

## 2020-01-01 RX ADMIN — Medication 200 MILLIGRAM(S): at 13:22

## 2020-01-01 RX ADMIN — SODIUM CHLORIDE 50 MILLILITER(S): 9 INJECTION INTRAMUSCULAR; INTRAVENOUS; SUBCUTANEOUS at 12:26

## 2020-01-01 RX ADMIN — Medication 25 MILLIGRAM(S): at 06:21

## 2020-01-01 RX ADMIN — ATORVASTATIN CALCIUM 20 MILLIGRAM(S): 80 TABLET, FILM COATED ORAL at 21:50

## 2020-01-01 RX ADMIN — Medication 0: at 11:55

## 2020-01-01 RX ADMIN — Medication 200 MILLIGRAM(S): at 12:11

## 2020-01-01 RX ADMIN — ZINC SULFATE TAB 220 MG (50 MG ZINC EQUIVALENT) 220 MILLIGRAM(S): 220 (50 ZN) TAB at 15:13

## 2020-01-01 RX ADMIN — Medication 500000 UNIT(S): at 01:07

## 2020-01-01 RX ADMIN — Medication 200 MILLIGRAM(S): at 13:20

## 2020-01-01 RX ADMIN — Medication 10: at 11:44

## 2020-01-01 RX ADMIN — LISINOPRIL 20 MILLIGRAM(S): 2.5 TABLET ORAL at 05:41

## 2020-01-01 RX ADMIN — ENOXAPARIN SODIUM 30 MILLIGRAM(S): 100 INJECTION SUBCUTANEOUS at 17:28

## 2020-01-01 RX ADMIN — Medication 8: at 12:01

## 2020-01-01 RX ADMIN — Medication 40 MILLIGRAM(S): at 06:12

## 2020-01-01 RX ADMIN — CEFTRIAXONE 100 MILLIGRAM(S): 500 INJECTION, POWDER, FOR SOLUTION INTRAMUSCULAR; INTRAVENOUS at 09:02

## 2020-01-01 RX ADMIN — Medication 100 MILLIGRAM(S): at 11:55

## 2020-01-01 RX ADMIN — LISINOPRIL 20 MILLIGRAM(S): 2.5 TABLET ORAL at 06:26

## 2020-01-01 RX ADMIN — SENNA PLUS 2 TABLET(S): 8.6 TABLET ORAL at 22:01

## 2020-01-01 RX ADMIN — ENOXAPARIN SODIUM 30 MILLIGRAM(S): 100 INJECTION SUBCUTANEOUS at 06:09

## 2020-01-01 RX ADMIN — Medication 1000 MILLIGRAM(S): at 05:55

## 2020-01-01 RX ADMIN — Medication 500000 UNIT(S): at 13:20

## 2020-01-01 RX ADMIN — TICAGRELOR 60 MILLIGRAM(S): 90 TABLET ORAL at 17:28

## 2020-01-01 RX ADMIN — Medication 4: at 08:42

## 2020-01-01 RX ADMIN — Medication 4: at 08:53

## 2020-01-01 RX ADMIN — Medication 2: at 18:32

## 2020-01-01 RX ADMIN — Medication 1000 MILLIGRAM(S): at 18:25

## 2020-01-01 RX ADMIN — ENOXAPARIN SODIUM 30 MILLIGRAM(S): 100 INJECTION SUBCUTANEOUS at 16:58

## 2020-01-01 RX ADMIN — Medication 500000 UNIT(S): at 12:30

## 2020-01-01 RX ADMIN — GABAPENTIN 100 MILLIGRAM(S): 400 CAPSULE ORAL at 05:56

## 2020-01-01 RX ADMIN — INSULIN GLARGINE 30 UNIT(S): 100 INJECTION, SOLUTION SUBCUTANEOUS at 22:04

## 2020-01-01 RX ADMIN — INSULIN GLARGINE 30 UNIT(S): 100 INJECTION, SOLUTION SUBCUTANEOUS at 21:45

## 2020-01-01 RX ADMIN — Medication 81 MILLIGRAM(S): at 12:33

## 2020-01-01 RX ADMIN — Medication 1000 MILLIGRAM(S): at 18:27

## 2020-01-01 RX ADMIN — Medication 1 APPLICATION(S): at 18:43

## 2020-01-01 RX ADMIN — HEPARIN SODIUM 700 UNIT(S)/HR: 5000 INJECTION INTRAVENOUS; SUBCUTANEOUS at 19:31

## 2020-01-01 RX ADMIN — Medication 10 MILLIGRAM(S): at 10:25

## 2020-01-01 RX ADMIN — LISINOPRIL 20 MILLIGRAM(S): 2.5 TABLET ORAL at 05:56

## 2020-01-01 RX ADMIN — ENOXAPARIN SODIUM 30 MILLIGRAM(S): 100 INJECTION SUBCUTANEOUS at 05:38

## 2020-01-01 RX ADMIN — SODIUM CHLORIDE 500 MILLILITER(S): 9 INJECTION INTRAMUSCULAR; INTRAVENOUS; SUBCUTANEOUS at 12:03

## 2020-01-01 RX ADMIN — Medication 500000 UNIT(S): at 23:26

## 2020-01-01 RX ADMIN — GABAPENTIN 100 MILLIGRAM(S): 400 CAPSULE ORAL at 23:27

## 2020-01-01 RX ADMIN — GABAPENTIN 100 MILLIGRAM(S): 400 CAPSULE ORAL at 13:20

## 2020-01-01 RX ADMIN — Medication 2: at 11:19

## 2020-01-01 RX ADMIN — ENOXAPARIN SODIUM 30 MILLIGRAM(S): 100 INJECTION SUBCUTANEOUS at 04:07

## 2020-01-01 RX ADMIN — Medication 10 MILLIGRAM(S): at 14:01

## 2020-01-01 RX ADMIN — Medication 40 MILLIGRAM(S): at 17:27

## 2020-01-01 RX ADMIN — TICAGRELOR 60 MILLIGRAM(S): 90 TABLET ORAL at 05:56

## 2020-01-01 RX ADMIN — Medication 500000 UNIT(S): at 00:06

## 2020-01-01 RX ADMIN — Medication 1000 MILLIGRAM(S): at 17:57

## 2020-01-01 RX ADMIN — Medication 100 MILLIGRAM(S): at 11:07

## 2020-01-01 RX ADMIN — ZINC SULFATE TAB 220 MG (50 MG ZINC EQUIVALENT) 220 MILLIGRAM(S): 220 (50 ZN) TAB at 11:44

## 2020-01-01 RX ADMIN — CHLORHEXIDINE GLUCONATE 1 APPLICATION(S): 213 SOLUTION TOPICAL at 06:11

## 2020-01-01 RX ADMIN — Medication 400 MILLIGRAM(S): at 04:47

## 2020-01-01 RX ADMIN — Medication 12: at 09:52

## 2020-01-01 RX ADMIN — HEPARIN SODIUM 500 UNIT(S)/HR: 5000 INJECTION INTRAVENOUS; SUBCUTANEOUS at 04:35

## 2020-01-01 RX ADMIN — Medication 5 MILLIGRAM(S): at 11:09

## 2020-01-01 RX ADMIN — Medication 4: at 08:50

## 2020-01-01 RX ADMIN — Medication 10 MILLIGRAM(S): at 06:10

## 2020-01-01 RX ADMIN — GABAPENTIN 100 MILLIGRAM(S): 400 CAPSULE ORAL at 21:50

## 2020-01-01 RX ADMIN — Medication 25 MILLIGRAM(S): at 05:46

## 2020-01-01 RX ADMIN — Medication 6: at 17:04

## 2020-01-01 RX ADMIN — Medication 10 MILLIGRAM(S): at 01:46

## 2020-01-01 RX ADMIN — ENOXAPARIN SODIUM 30 MILLIGRAM(S): 100 INJECTION SUBCUTANEOUS at 06:10

## 2020-01-01 RX ADMIN — Medication 1 ENEMA: at 12:02

## 2020-01-01 RX ADMIN — GABAPENTIN 100 MILLIGRAM(S): 400 CAPSULE ORAL at 22:02

## 2020-01-01 RX ADMIN — SODIUM CHLORIDE 500 MILLILITER(S): 9 INJECTION INTRAMUSCULAR; INTRAVENOUS; SUBCUTANEOUS at 17:28

## 2020-01-01 RX ADMIN — Medication 4: at 16:25

## 2020-01-01 RX ADMIN — HEPARIN SODIUM 0 UNIT(S)/HR: 5000 INJECTION INTRAVENOUS; SUBCUTANEOUS at 18:27

## 2020-01-01 RX ADMIN — Medication 5 MILLIGRAM(S): at 02:00

## 2020-01-01 RX ADMIN — TICAGRELOR 60 MILLIGRAM(S): 90 TABLET ORAL at 05:41

## 2020-01-01 RX ADMIN — Medication 1000 MILLIGRAM(S): at 05:46

## 2020-01-01 RX ADMIN — CHLORHEXIDINE GLUCONATE 1 APPLICATION(S): 213 SOLUTION TOPICAL at 18:43

## 2020-01-01 RX ADMIN — ATORVASTATIN CALCIUM 20 MILLIGRAM(S): 80 TABLET, FILM COATED ORAL at 23:27

## 2020-01-01 RX ADMIN — Medication 650 MILLIGRAM(S): at 11:55

## 2020-01-01 RX ADMIN — Medication 25 MILLIGRAM(S): at 18:28

## 2020-01-01 RX ADMIN — Medication 1000 MILLIGRAM(S): at 05:41

## 2020-01-01 RX ADMIN — ENOXAPARIN SODIUM 30 MILLIGRAM(S): 100 INJECTION SUBCUTANEOUS at 18:29

## 2020-01-01 RX ADMIN — Medication 100 MILLIGRAM(S): at 06:10

## 2020-01-01 RX ADMIN — ATORVASTATIN CALCIUM 20 MILLIGRAM(S): 80 TABLET, FILM COATED ORAL at 22:05

## 2020-01-01 RX ADMIN — SODIUM CHLORIDE 50 MILLILITER(S): 9 INJECTION INTRAMUSCULAR; INTRAVENOUS; SUBCUTANEOUS at 04:09

## 2020-01-01 RX ADMIN — Medication 25 MILLIGRAM(S): at 05:41

## 2020-01-01 RX ADMIN — Medication 25 MILLIGRAM(S): at 17:57

## 2020-01-01 RX ADMIN — ENOXAPARIN SODIUM 30 MILLIGRAM(S): 100 INJECTION SUBCUTANEOUS at 05:46

## 2020-01-01 RX ADMIN — TICAGRELOR 60 MILLIGRAM(S): 90 TABLET ORAL at 17:56

## 2020-01-01 RX ADMIN — Medication 40 MILLIGRAM(S): at 18:25

## 2020-01-01 RX ADMIN — GABAPENTIN 100 MILLIGRAM(S): 400 CAPSULE ORAL at 21:46

## 2020-01-01 RX ADMIN — Medication 25 MILLIGRAM(S): at 18:25

## 2020-01-01 RX ADMIN — TICAGRELOR 60 MILLIGRAM(S): 90 TABLET ORAL at 18:59

## 2020-01-01 RX ADMIN — Medication 650 MILLIGRAM(S): at 15:13

## 2020-01-01 RX ADMIN — ATORVASTATIN CALCIUM 20 MILLIGRAM(S): 80 TABLET, FILM COATED ORAL at 21:46

## 2020-01-01 RX ADMIN — Medication 25 MILLIGRAM(S): at 16:57

## 2020-01-01 RX ADMIN — GABAPENTIN 100 MILLIGRAM(S): 400 CAPSULE ORAL at 14:01

## 2020-01-01 RX ADMIN — Medication 40 MILLIGRAM(S): at 06:26

## 2020-01-01 RX ADMIN — Medication 200 MILLIGRAM(S): at 00:06

## 2020-01-01 RX ADMIN — Medication 1000 MILLIGRAM(S): at 06:09

## 2020-01-01 RX ADMIN — Medication 500000 UNIT(S): at 17:01

## 2020-01-01 RX ADMIN — CEFTRIAXONE 100 MILLIGRAM(S): 500 INJECTION, POWDER, FOR SOLUTION INTRAMUSCULAR; INTRAVENOUS at 07:57

## 2020-01-01 RX ADMIN — Medication 40 MILLIGRAM(S): at 17:56

## 2020-01-01 RX ADMIN — SODIUM CHLORIDE 50 MILLILITER(S): 9 INJECTION INTRAMUSCULAR; INTRAVENOUS; SUBCUTANEOUS at 01:45

## 2020-01-01 RX ADMIN — SODIUM CHLORIDE 50 MILLILITER(S): 9 INJECTION INTRAMUSCULAR; INTRAVENOUS; SUBCUTANEOUS at 12:01

## 2020-01-01 RX ADMIN — GABAPENTIN 100 MILLIGRAM(S): 400 CAPSULE ORAL at 06:10

## 2020-01-01 RX ADMIN — GABAPENTIN 100 MILLIGRAM(S): 400 CAPSULE ORAL at 15:27

## 2020-01-01 RX ADMIN — Medication 500000 UNIT(S): at 05:42

## 2020-01-01 RX ADMIN — ENOXAPARIN SODIUM 30 MILLIGRAM(S): 100 INJECTION SUBCUTANEOUS at 18:25

## 2020-01-01 RX ADMIN — Medication 1000 MILLIGRAM(S): at 17:31

## 2020-01-01 RX ADMIN — Medication 2: at 22:00

## 2020-01-01 RX ADMIN — Medication 40 MILLIGRAM(S): at 05:56

## 2020-01-01 RX ADMIN — HEPARIN SODIUM 0 UNIT(S)/HR: 5000 INJECTION INTRAVENOUS; SUBCUTANEOUS at 03:24

## 2020-01-01 RX ADMIN — Medication 4: at 12:28

## 2020-01-01 RX ADMIN — CEFTRIAXONE 100 MILLIGRAM(S): 500 INJECTION, POWDER, FOR SOLUTION INTRAMUSCULAR; INTRAVENOUS at 10:02

## 2020-01-01 RX ADMIN — HEPARIN SODIUM 850 UNIT(S)/HR: 5000 INJECTION INTRAVENOUS; SUBCUTANEOUS at 08:32

## 2020-01-01 RX ADMIN — POTASSIUM PHOSPHATE, MONOBASIC POTASSIUM PHOSPHATE, DIBASIC 62.5 MILLIMOLE(S): 236; 224 INJECTION, SOLUTION INTRAVENOUS at 10:25

## 2020-01-01 RX ADMIN — Medication 10: at 08:20

## 2020-01-01 RX ADMIN — ZINC SULFATE TAB 220 MG (50 MG ZINC EQUIVALENT) 220 MILLIGRAM(S): 220 (50 ZN) TAB at 13:20

## 2020-04-02 NOTE — ED ADULT NURSE NOTE - ED STAT RN HANDOFF DETAILS
received pt from [facility]. Pt found laying on stretcher in no acute distress. placed on cardiac monitor, rsr without ectopy. iv placed, labs drawn and sent. C-19 swab sent. will continue to monitor awaiting results

## 2020-04-02 NOTE — ED ADULT NURSE NOTE - ED STAT RN HANDOFF DETAILS 3
Endorsed pt to [facility], pt sleeping on stretcher in no acute distress and RSR in the 70s on monitor. respirations even and unlabored.

## 2020-04-02 NOTE — ED ADULT NURSE NOTE - OBJECTIVE STATEMENT
received er bed 8 biba c/o fever/cough/shortness of breath x 1 week with pleuritic chest discomfort per ems hypoxic at 79% on r/a improved with o2 100% nrb in place b/l breath sounds diminished spouse and other family in household with similar symptoms

## 2020-04-02 NOTE — ED PROVIDER NOTE - OBJECTIVE STATEMENT
80 y/o w/pmhx of HTN DM CAD x3 stents comes into hospital with cough x3 days and SOB starting today. Pt also had chest tightness today. Pt reports this does not feel like heart attack like in past. Pt has no abdominal pain, no N/V/D. Pt also has no known Covid contacts. Pt denies pleuritic pain. Pt has no hx DVT /PE

## 2020-04-03 PROBLEM — E11.9 TYPE 2 DIABETES MELLITUS WITHOUT COMPLICATIONS: Chronic | Status: ACTIVE | Noted: 2017-12-31

## 2020-04-03 PROBLEM — I25.10 ATHEROSCLEROTIC HEART DISEASE OF NATIVE CORONARY ARTERY WITHOUT ANGINA PECTORIS: Chronic | Status: ACTIVE | Noted: 2017-12-31

## 2020-04-03 PROBLEM — M31.6 OTHER GIANT CELL ARTERITIS: Chronic | Status: ACTIVE | Noted: 2017-12-31

## 2020-04-03 PROBLEM — M19.90 UNSPECIFIED OSTEOARTHRITIS, UNSPECIFIED SITE: Chronic | Status: ACTIVE | Noted: 2017-12-31

## 2020-04-03 PROBLEM — I10 ESSENTIAL (PRIMARY) HYPERTENSION: Chronic | Status: ACTIVE | Noted: 2017-12-31

## 2020-04-03 PROBLEM — Z95.5 PRESENCE OF CORONARY ANGIOPLASTY IMPLANT AND GRAFT: Chronic | Status: ACTIVE | Noted: 2017-12-31

## 2020-04-03 NOTE — CHART NOTE - NSCHARTNOTEFT_GEN_A_CORE
Medicine PA Note    [facility] called for respiratory distress. Patient seen at bedside with Dr Knowles.  Patient is Medicine PA Note    [facility] called for respiratory distress. Patient seen at bedside with Dr Knowles.  Patient is 79 yr old female with PMH of HTN, DM, CAD x 3 stents  admitted for coronavirus infection r/o ACS. Patient became acutely dyspneic, abdominal breathing  and coughing pink/reddish tinge sputum .     /96, 93 , 39, 84% NRB  FS pending    Gen Patient alert, awake and oriented x 3 , respiratory distress  Cardio bigemy on cardiac monitor , s1 s2 no murmurs appreciated  resp labored breathing, crackles at bases   abd soft nontendered , nondistended , Bs +   Ext no calf tenderness, no edema, pulses + b/l   neuro movement of all extremities , AAxO x 3 , sensory intact    ABG - ( 03 Apr 2020 02:02 )  pH, Arterial: x     pH, Blood: 7.40  /  pCO2: 26    /  pO2: 55    / HCO3: 16    / Base Excess: -7.6  /  SaO2: 88     A/P   79 yr old female with PMH of Htn, Dm cad x 3 stents admitted for coronavirus infection r/o ACS now with acute respiratory distress  abg stat see above  lasix 40 mg ivp for pulm edema  labetalol 5 mg ivp for Above Bp   158/65, 87, 36, 93 NC/NRB  pt placed on NC as well as NRB  ICU at [facility]   will continue to monitor the patient  [facility]

## 2020-04-03 NOTE — ED ADULT NURSE REASSESSMENT NOTE - REASSESS COMMUNICATION
ED physician notified/consult called/primary physician called ED physician notified/primary physician called

## 2020-04-03 NOTE — PROGRESS NOTE ADULT - SUBJECTIVE AND OBJECTIVE BOX
58 y.o. male with a PMHx of DM and HTN presented to the ER c/o Right upper dental pain for the past 3 days.  Pt seen and tx earlier this AM in this ER and given RX for Amoxicillin but not given anything for pain.  Pt denies fever, chills, dysphagia, SOB. Patient is a 79y old  Female who presents with a chief complaint of dyspnea (2020 02:59)  patient still dyspnoec. On 2 l she is sating 90%.    repeat EKG shows sinus rhythm, left axis, incomplete RBBB and some st changes in ant leads , could be non specific. or realted to Hypoxia.   Hyponatremic, -POA  mild kylee is present- POA    INTERVAL HPI/OVERNIGHT EVENTS:  PAST MEDICAL & SURGICAL HISTORY:  Stented coronary artery  Arthritis  DM (diabetes mellitus)  CAD (coronary artery disease)  Hypertension  Temporal arteritis  H/O:   3-vessel CAD      MEDICATIONS  (STANDING):  aspirin enteric coated 81 milliGRAM(s) Oral daily  atorvastatin 20 milliGRAM(s) Oral at bedtime  dextrose 5%. 1000 milliLiter(s) (50 mL/Hr) IV Continuous <Continuous>  dextrose 50% Injectable 12.5 Gram(s) IV Push once  dextrose 50% Injectable 25 Gram(s) IV Push once  dextrose 50% Injectable 25 Gram(s) IV Push once  enoxaparin Injectable 30 milliGRAM(s) SubCutaneous two times a day  insulin lispro (HumaLOG) corrective regimen sliding scale   SubCutaneous three times a day before meals  insulin lispro (HumaLOG) corrective regimen sliding scale   SubCutaneous at bedtime  lisinopril 20 milliGRAM(s) Oral daily  sodium chloride 0.9%. 1000 milliLiter(s) (50 mL/Hr) IV Continuous <Continuous>  ticagrelor 60 milliGRAM(s) Oral every 12 hours    MEDICATIONS  (PRN):  dextrose 40% Gel 15 Gram(s) Oral once PRN Blood Glucose LESS THAN 70 milliGRAM(s)/deciliter  glucagon  Injectable 1 milliGRAM(s) IntraMuscular once PRN Glucose LESS THAN 70 milligrams/deciliter      Allergies    codeine (Nausea)    Intolerances        REVIEW OF SYSTEMS:  CONSTITUTIONAL: No fever, weight loss, or fatigue  EYES: No eye pain, visual disturbances, or discharge  ENMT:  No difficulty hearing, tinnitus, vertigo; No sinus or throat pain  NECK: No pain or stiffness  BREASTS: No pain, masses, or nipple discharge  RESPIRATORY: No cough, wheezing, chills or hemoptysis; No shortness of breath  CARDIOVASCULAR: No chest pain, palpitations, dizziness, or leg swelling  GASTROINTESTINAL: No abdominal or epigastric pain. No nausea, vomiting, or hematemesis; No diarrhea or constipation. No melena or hematochezia.  GENITOURINARY: No dysuria, frequency, hematuria, or incontinence  NEUROLOGICAL: No headaches, memory loss, loss of strength, numbness, or tremors  SKIN: No itching, burning, rashes, or lesions   LYMPH NODES: No enlarged glands  ENDOCRINE: No heat or cold intolerance; No hair loss  MUSCULOSKELETAL: No joint pain or swelling; No muscle, back, or extremity pain  PSYCHIATRIC: No depression, anxiety, mood swings, or difficulty sleeping  HEME/LYMPH: No easy bruising, or bleeding gums  ALLERY AND IMMUNOLOGIC: No hives or eczema    Vital Signs Last 24 Hrs  T(C): 37.6 (2020 04:00), Max: 37.6 (2020 04:00)  T(F): 99.6 (2020 04:00), Max: 99.6 (2020 04:00)  HR: 97 (2020 11:56) (67 - 98)  BP: 148/40 (2020 11:56) (108/38 - 192/70)  BP(mean): --  RR: 27 (2020 11:56) (17 - 38)  SpO2: 97% (2020 11:56) (93% - 100%)    PHYSICAL EXAM:  GENERAL: NAD, well-groomed, well-developed. alert . awake, mod dyspnea.   HEAD:  Atraumatic, Normocephalic  EYES: EOMI, PERRLA, conjunctiva and sclera clear  ENMT: No tonsillar erythema, exudates, or enlargement; Moist mucous membranes, Good dentition, No lesions  NECK: Supple, No JVD, Normal thyroid  NERVOUS SYSTEM:  Alert & Oriented X3, Good concentration; Motor Strength 5/5 B/L upper and lower extremities; DTRs 2+ intact and symmetric  CHEST/LUNG: Clear to percussion bilaterally; No rales, rhonchi, wheezing, or rubs  HEART: Regular rate and rhythm; No murmurs, rubs, or gallops  ABDOMEN: Soft, Nontender, Nondistended; Bowel sounds present  EXTREMITIES:  2+ Peripheral Pulses, No clubbing, cyanosis, or edema  LYMPH: No lymphadenopathy noted  SKIN: No rashes or lesions    LABS:                        9.1    10.08 )-----------( 248      ( 2020 23:36 )             29.5     04-02    129<L>  |  100  |  8   ----------------------------<  122<H>  4.2   |  19<L>  |  0.92    Ca    8.6      2020 23:36    TPro  7.0  /  Alb  2.5<L>  /  TBili  0.4  /  DBili  x   /  AST  54<H>  /  ALT  35  /  AlkPhos  59  04-      PT/INR - ( 2020 23:36 )   PT: 12.5 sec;   INR: 1.11 ratio         PTT - ( 2020 23:36 )  PTT:31.1 sec    CAPILLARY BLOOD GLUCOSE      POCT Blood Glucose.: 142 mg/dL (2020 12:00)  POCT Blood Glucose.: 130 mg/dL (2020 08:36)    ABG - ( 2020 02:02 )  pH, Arterial: x     pH, Blood: 7.40  /  pCO2: 26    /  pO2: 55    / HCO3: 16    / Base Excess: -7.6  /  SaO2: 88                CARDIAC MARKERS ( 2020 23:36 )  <.015 ng/mL / x     / x     / x     / x                RADIOLOGY & ADDITIONAL TESTS:    Imaging Personally Reviewed:  [ ] YES  [ ] NO    Consultant(s) Notes Reviewed:  [ ] YES  [ ] NO    Care Discussed with Consultants/Other Providers [ ] YES  [ ] NO    Care discussed with family,         [  ]   yes  [  ]  No    imp:    stable[ ]    unstable[  ]     improving [   ]       unchanged  [ x ]                Plans:  Continue present plans  [ x ] will start Keytruda.               New consult [  ]   specialty  .......               order test[  ]    test name.                  Discharge Planning  [  ] Patient is a 79y old  Female who presents with a chief complaint of dyspnea (2020 02:59)  patient still dyspnoec. On 2 l she is sating 90%.    repeat EKG shows sinus rhythm, left axis, incomplete RBBB and some st changes in ant leads , could be non specific. or realted to Hypoxia.   Hyponatremic, -POA  mild kylee is present- POA    INTERVAL HPI/OVERNIGHT EVENTS:  PAST MEDICAL & SURGICAL HISTORY:  Stented coronary artery  Arthritis  DM (diabetes mellitus)  CAD (coronary artery disease)  Hypertension  Temporal arteritis  H/O:   3-vessel CAD      MEDICATIONS  (STANDING):  aspirin enteric coated 81 milliGRAM(s) Oral daily  atorvastatin 20 milliGRAM(s) Oral at bedtime  dextrose 5%. 1000 milliLiter(s) (50 mL/Hr) IV Continuous <Continuous>  dextrose 50% Injectable 12.5 Gram(s) IV Push once  dextrose 50% Injectable 25 Gram(s) IV Push once  dextrose 50% Injectable 25 Gram(s) IV Push once  enoxaparin Injectable 30 milliGRAM(s) SubCutaneous two times a day  insulin lispro (HumaLOG) corrective regimen sliding scale   SubCutaneous three times a day before meals  insulin lispro (HumaLOG) corrective regimen sliding scale   SubCutaneous at bedtime  lisinopril 20 milliGRAM(s) Oral daily  sodium chloride 0.9%. 1000 milliLiter(s) (50 mL/Hr) IV Continuous <Continuous>  ticagrelor 60 milliGRAM(s) Oral every 12 hours    MEDICATIONS  (PRN):  dextrose 40% Gel 15 Gram(s) Oral once PRN Blood Glucose LESS THAN 70 milliGRAM(s)/deciliter  glucagon  Injectable 1 milliGRAM(s) IntraMuscular once PRN Glucose LESS THAN 70 milligrams/deciliter      Allergies    codeine (Nausea)    Intolerances        REVIEW OF SYSTEMS:  CONSTITUTIONAL: No fever, weight loss, or fatigue  EYES: No eye pain, visual disturbances, or discharge  ENMT:  No difficulty hearing, tinnitus, vertigo; No sinus or throat pain  NECK: No pain or stiffness  BREASTS: No pain, masses, or nipple discharge  RESPIRATORY: No cough, wheezing, chills or hemoptysis; No shortness of breath  CARDIOVASCULAR: No chest pain, palpitations, dizziness, or leg swelling  GASTROINTESTINAL: No abdominal or epigastric pain. No nausea, vomiting, or hematemesis; No diarrhea or constipation. No melena or hematochezia.  GENITOURINARY: No dysuria, frequency, hematuria, or incontinence  NEUROLOGICAL: No headaches, memory loss, loss of strength, numbness, or tremors  SKIN: No itching, burning, rashes, or lesions   LYMPH NODES: No enlarged glands  ENDOCRINE: No heat or cold intolerance; No hair loss  MUSCULOSKELETAL: No joint pain or swelling; No muscle, back, or extremity pain  PSYCHIATRIC: No depression, anxiety, mood swings, or difficulty sleeping  HEME/LYMPH: No easy bruising, or bleeding gums  ALLERY AND IMMUNOLOGIC: No hives or eczema    Vital Signs Last 24 Hrs  T(C): 37.6 (2020 04:00), Max: 37.6 (2020 04:00)  T(F): 99.6 (2020 04:00), Max: 99.6 (2020 04:00)  HR: 97 (2020 11:56) (67 - 98)  BP: 148/40 (2020 11:56) (108/38 - 192/70)  BP(mean): --  RR: 27 (2020 11:56) (17 - 38)  SpO2: 97% (2020 11:56) (93% - 100%)    PHYSICAL EXAM:  GENERAL: NAD, well-groomed, well-developed. alert . awake, mod dyspnea.   HEAD:  Atraumatic, Normocephalic  EYES: EOMI, PERRLA, conjunctiva and sclera clear  ENMT: No tonsillar erythema, exudates, or enlargement; Moist mucous membranes, Good dentition, No lesions  NECK: Supple, No JVD, Normal thyroid  NERVOUS SYSTEM:  Alert & Oriented X3, Good concentration; Motor Strength 5/5 B/L upper and lower extremities; DTRs 2+ intact and symmetric  CHEST/LUNG: Clear to percussion bilaterally; No rales, rhonchi, wheezing, or rubs  HEART: Regular rate and rhythm; No murmurs, rubs, or gallops  ABDOMEN: Soft, Nontender, Nondistended; Bowel sounds present  EXTREMITIES:  2+ Peripheral Pulses, No clubbing, cyanosis, or edema  LYMPH: No lymphadenopathy noted  SKIN: No rashes or lesions    LABS:                        9.1    10.08 )-----------( 248      ( 2020 23:36 )             29.5     04-02    129<L>  |  100  |  8   ----------------------------<  122<H>  4.2   |  19<L>  |  0.92    Ca    8.6      2020 23:36    TPro  7.0  /  Alb  2.5<L>  /  TBili  0.4  /  DBili  x   /  AST  54<H>  /  ALT  35  /  AlkPhos  59  04-      PT/INR - ( 2020 23:36 )   PT: 12.5 sec;   INR: 1.11 ratio         PTT - ( 2020 23:36 )  PTT:31.1 sec    CAPILLARY BLOOD GLUCOSE      POCT Blood Glucose.: 142 mg/dL (2020 12:00)  POCT Blood Glucose.: 130 mg/dL (2020 08:36)    ABG - ( 2020 02:02 )  pH, Arterial: x     pH, Blood: 7.40  /  pCO2: 26    /  pO2: 55    / HCO3: 16    / Base Excess: -7.6  /  SaO2: 88                CARDIAC MARKERS ( 2020 23:36 )  <.015 ng/mL / x     / x     / x     / x                RADIOLOGY & ADDITIONAL TESTS:    Imaging Personally Reviewed:  [ ] YES  [ ] NO    Consultant(s) Notes Reviewed:  [ ] YES  [ ] NO    Care Discussed with Consultants/Other Providers [ ] YES  [ ] NO    Care discussed with family,         [  ]   yes  [  ]  No    imp:    stable[ ]    unstable[  ]     improving [   ]       unchanged  [ x ]                Plans:  Continue present plans  [ x ] will start Keytruda.               New consult [  ]   specialty  .......cardiology consult.                order test[  ]    test name.                  Discharge Planning  [  ]

## 2020-04-03 NOTE — ED ADULT NURSE REASSESSMENT NOTE - NS ED NURSE REASSESS COMMENT FT1
Upon reassessing pt, pt noted to be very tachypneic and in acute distress. Audible rhonchi noted. No pedal edema. Pt repositioned in bed, vitals obtained, Upon reassessing pt, pt noted to be very tachypneic and in acute distress. Audible rhonchi noted. No pedal edema. Pt repositioned in bed, vitals obtained, updated Dr. Knowles regarding SpO2 in the 70s and bigeminy on EKG monitor. Dr. Knowles summoned.

## 2020-04-03 NOTE — H&P ADULT - PROBLEM SELECTOR PLAN 1
COVID 19 swabbed in ED.  Flu/RSV negative.  RVP pending.  Isolation precautions  EKG to check QTc, Follow official CXR, Blood Cx   Tylenol PRN fever  Start Plaquenil,

## 2020-04-03 NOTE — H&P ADULT - HISTORY OF PRESENT ILLNESS
79F with a PMH of CAD s/p 3 stents, HTN, DM, temperal arteritis, OA who presents to the ED for for 3 days of fever and cough.  Patient admitted for evaluation for COVID19.  Has had symptoms for 3 days.  Symptoms include fever, chills, cough, generalized weakness and increased dyspnea.  Cough has been productive of blood tinged sputum.  Reports multiple of her family members are sick as well.  Presents in moderate to severe respiratory distress.  Rapid response was called as patietn was saturating 75-80% on nonrebreather.  Started on nasal cannula as well, improved to 90%.  Decision was made to monitor.  CXR shows bilateral PNA.  Will admit to tele.

## 2020-04-03 NOTE — ED ADULT NURSE REASSESSMENT NOTE - NS ED NURSE REASSESS COMMENT FT1
she is awake appears weak on cardiac monitor and oxygen via nonrebreather mask. Alvarez to bedside drainage patent.

## 2020-04-03 NOTE — PROGRESS NOTE ADULT - ASSESSMENT
covid Pneumonia with resp failure.    cad with 3 stents   Diabtes   HTN   Hyponatremai   milk MARTHA   temporal arteritis..     Prognosis poor/ guarded

## 2020-04-03 NOTE — H&P ADULT - NSHPLABSRESULTS_GEN_ALL_CORE
Recent Vitals  T(C): 36.7 (04-02-20 @ 22:34), Max: 36.7 (04-02-20 @ 22:34)  HR: 84 (04-02-20 @ 23:30) (67 - 84)  BP: 192/70 (04-02-20 @ 23:30) (154/73 - 192/70)  RR: 24 (04-02-20 @ 23:30) (18 - 24)  SpO2: 96% (04-02-20 @ 23:30) (96% - 97%)                        9.1    10.08 )-----------( 248      ( 02 Apr 2020 23:36 )             29.5     04-02    129<L>  |  100  |  8   ----------------------------<  122<H>  4.2   |  19<L>  |  0.92    Ca    8.6      02 Apr 2020 23:36    TPro  7.0  /  Alb  2.5<L>  /  TBili  0.4  /  DBili  x   /  AST  54<H>  /  ALT  35  /  AlkPhos  59  04-02    PT/INR - ( 02 Apr 2020 23:36 )   PT: 12.5 sec;   INR: 1.11 ratio         PTT - ( 02 Apr 2020 23:36 )  PTT:31.1 sec  LIVER FUNCTIONS - ( 02 Apr 2020 23:36 )  Alb: 2.5 g/dL / Pro: 7.0 gm/dL / ALK PHOS: 59 U/L / ALT: 35 U/L / AST: 54 U/L / GGT: x               Home Medications:  amLODIPine 5 mg oral tablet: 1 tab(s) orally once a day (02 Apr 2020 23:00)  aspirin 81 mg oral tablet: 1 tab(s) orally once a day (02 Apr 2020 23:00)  Brilinta (ticagrelor) 60 mg oral tablet: 1 tab(s) orally 2 times a day (02 Apr 2020 23:00)  Centrum oral tablet: 1 tab(s) orally once a day (02 Apr 2020 23:00)  Clarinex 5 mg oral tablet: 1 tab(s) orally once a day (02 Apr 2020 23:00)  clonazePAM:  (02 Apr 2020 23:00)  Dexilant 60 mg oral delayed release capsule: 1 cap(s) orally once a day (02 Apr 2020 23:00)  folic acid 1 mg oral tablet: 1 tab(s) orally once a day (02 Apr 2020 23:00)  gabapentin: 200 milligram(s) orally 3 times a day (02 Apr 2020 23:00)  insulin lispro 100 units/mL injectable solution (obsolete):  (02 Apr 2020 23:00)  isosorbide dinitrate 20 mg oral tablet: 1 tab(s) orally 2 times a day (02 Apr 2020 23:00)  Janumet 50 mg-1000 mg oral tablet: 1 tab(s) orally 2 times a day (02 Apr 2020 23:00)  Janumet 50 mg-1000 mg oral tablet: 1 tab(s) orally 2 times a day (02 Apr 2020 23:00)  lisinopril 20 mg oral tablet: 1 tab(s) orally once a day (02 Apr 2020 23:00)  nadolol 20 mg oral tablet: 2 tab(s) orally once a day (02 Apr 2020 23:00)  NovoLIN 70/30 subcutaneous suspension:  (02 Apr 2020 23:00)  Oysco 500 with D 500 mg-200 intl units (5 mcg) oral tablet: 1 tab(s) orally 2 times a day (02 Apr 2020 23:00)  Pataday 0.2% ophthalmic solution: 1 drop(s) to each affected eye once a day (02 Apr 2020 23:00)  predniSONE 2.5 mg oral tablet: 1 tab(s) orally once a day (at bedtime) (02 Apr 2020 23:00)  Trilipix 45 mg oral delayed release capsule: 1 cap(s) orally once a day (02 Apr 2020 23:00)  Vitamin B12 1000 mcg oral tablet: 1 tab(s) orally once a day (02 Apr 2020 23:00)  Vitamin C 1000 mg oral tablet: 1 tab(s) orally once a day (02 Apr 2020 23:00)

## 2020-04-03 NOTE — ED ADULT NURSE REASSESSMENT NOTE - NS ED NURSE REASSESS COMMENT FT1
After conferring with Dr. Knowles at bedside, obtained 12-lead EKG that showed a wide QRS complex rhythm, with breaks of sinus with bigeminy, and no improvement in SpO2, RRT was called at 0120hrs. Pt was coached on deep, controlled breathing, SpO2 sensor was changed out to different body parts, and pt was placed on NC at 6lpm in addition to the NRB already in place at 15lpm.  Pt was given 40mg lasix IVP and then 5mg of labetalol as per orders. Critical care doctor ordered lawrence be placed. 16french Lawrence placed and secured to right leg. RSR resumed on monitor and SpO2 was 88%-95%. Pt was also noted to be less dyspneic and tolerating O2 therapy well. will continue to monitor awaiting placement on unit.

## 2020-04-03 NOTE — H&P ADULT - NSHPPHYSICALEXAM_GEN_ALL_CORE
Physical exam:  General: patient in moderate to severe distress   Head:  Atraumatic, Normocephalic  Eyes: EOMI, PERRLA, clear sclera  Neck: Supple, thyroid nontender, non enlarged  Cardio: S1/S2 +ve, regular rate and rhythm, no M/G/R  Resp: bilatreal rales  GI: abdomen soft, nontender, non distended, no guarding, BS +ve x 4  Ext: no significant pedal edema  Neuro: CN 2-12 intact, no significant motor or sensory deficits.  Skin: No rashes or lesions

## 2020-04-04 NOTE — CONSULT NOTE ADULT - ASSESSMENT
The chart has been reviewed but the patient has not yet been examined.  Full note to follow. 78yo F with bilateral pneumonia sec to COVID 19.    Known h/o CAD s/p PTCA, HTN, DM, temporal arteritis, OA.    Positive second set of cardiac enzymes and ECG suggestive of intermittent RBBB and ECG changes consistent with NSTEMI.  On ASA, statin, ACEI. Was on CACB and bbl at home; suggest restart low dose bbl for now.        The chart has been reviewed but the patient has not yet been examined.  Full note to follow. 78yo F with bilateral pneumonia sec to COVID 19.    Known h/o CAD s/p PTCA, HTN, DM, temporal arteritis, OA.    Positive second set of cardiac enzymes and ECG suggestive of intermittent RBBB and ECG changes consistent with NSTEMI.  On ASA, statin, ACEI. Was on CACB and bbl at home; suggest restart low dose bbl for now.    Hemodynamically stable, otherwise no cardiac symptoms for now.  No indication for further interventions at present, will consider need/type of risk stratification after resolution of COVID infection.

## 2020-04-04 NOTE — CONSULT NOTE ADULT - SUBJECTIVE AND OBJECTIVE BOX
CARDIOLOGY CONSULTATION NOTE                                                                               NESTOR REILLY is a 79y Female with a PMH of CAD s/p 3 stents, HTN, DM, temporal arteritis, OA who presents to the ED for for 3 days of fever and cough.  Patient admitted for evaluation for COVID19.  Has had symptoms for 3 days.  Symptoms include fever, chills, cough, generalized weakness and increased dyspnea.  Cough has been productive of blood tinged sputum.  Reports multiple of her family members are sick as well.  Presents in moderate to severe respiratory distress.  Rapid response was called as ivyetn was saturating 75-80% on nonrebreather.  Started on nasal cannula as well, improved to 90%.  Decision was made to monitor.  CXR shows bilateral PNA.        REVIEW OF SYSTEMS:  -----------------------------    CONSTITUTIONAL: No fever, weight loss, or fatigue  EYES: No eye pain, visual disturbances, or discharge  ENMT:  No difficulty hearing, tinnitus, vertigo; No sinus or throat pain  NECK: No pain or stiffness  BREASTS: No pain, masses, or nipple discharge  RESPIRATORY: No cough, wheezing, chills or hemoptysis; No shortness of breath  CARDIOVASCULAR: See HPI  GASTROINTESTINAL: No abdominal or epigastric pain. No nausea, vomiting, or hematemesis; No diarrhea or constipation. No melena or hematochezia.  GENITOURINARY: No dysuria, frequency, hematuria, or incontinence  NEUROLOGICAL: No headaches, memory loss, loss of strength, numbness, or tremors  SKIN: No itching, burning, rashes, or lesions   LYMPH NODES: No enlarged glands  ENDOCRINE: No heat or cold intolerance; No hair loss  MUSCULOSKELETAL: No joint pain or swelling; No muscle, back, or extremity pain  PSYCHIATRIC: No depression, anxiety, mood swings, or difficulty sleeping  HEME/LYMPH: No easy bruising, or bleeding gums  ALLERGY AND IMMUNOLOGIC: No hives or eczema    Home Medications:  amLODIPine 5 mg oral tablet: 1 tab(s) orally once a day (02 Apr 2020 23:00)  aspirin 81 mg oral tablet: 1 tab(s) orally once a day (02 Apr 2020 23:00)  Brilinta (ticagrelor) 60 mg oral tablet: 1 tab(s) orally 2 times a day (02 Apr 2020 23:00)  Centrum oral tablet: 1 tab(s) orally once a day (02 Apr 2020 23:00)  Clarinex 5 mg oral tablet: 1 tab(s) orally once a day (02 Apr 2020 23:00)  clonazePAM:  (02 Apr 2020 23:00)  Dexilant 60 mg oral delayed release capsule: 1 cap(s) orally once a day (02 Apr 2020 23:00)  folic acid 1 mg oral tablet: 1 tab(s) orally once a day (02 Apr 2020 23:00)  gabapentin: 200 milligram(s) orally 3 times a day (02 Apr 2020 23:00)  insulin lispro 100 units/mL injectable solution (obsolete):  (02 Apr 2020 23:00)  isosorbide dinitrate 20 mg oral tablet: 1 tab(s) orally 2 times a day (02 Apr 2020 23:00)  Janumet 50 mg-1000 mg oral tablet: 1 tab(s) orally 2 times a day (02 Apr 2020 23:00)  Janumet 50 mg-1000 mg oral tablet: 1 tab(s) orally 2 times a day (02 Apr 2020 23:00)  lisinopril 20 mg oral tablet: 1 tab(s) orally once a day (02 Apr 2020 23:00)  nadolol 20 mg oral tablet: 2 tab(s) orally once a day (02 Apr 2020 23:00)  NovoLIN 70/30 subcutaneous suspension:  (02 Apr 2020 23:00)  Oysco 500 with D 500 mg-200 intl units (5 mcg) oral tablet: 1 tab(s) orally 2 times a day (02 Apr 2020 23:00)  Pataday 0.2% ophthalmic solution: 1 drop(s) to each affected eye once a day (02 Apr 2020 23:00)  predniSONE 2.5 mg oral tablet: 1 tab(s) orally once a day (at bedtime) (02 Apr 2020 23:00)  Trilipix 45 mg oral delayed release capsule: 1 cap(s) orally once a day (02 Apr 2020 23:00)  Vitamin B12 1000 mcg oral tablet: 1 tab(s) orally once a day (02 Apr 2020 23:00)  Vitamin C 1000 mg oral tablet: 1 tab(s) orally once a day (02 Apr 2020 23:00)      MEDICATIONS  (STANDING):  ascorbic acid 1000 milliGRAM(s) Oral two times a day  aspirin enteric coated 81 milliGRAM(s) Oral daily  atorvastatin 20 milliGRAM(s) Oral at bedtime  dextrose 5%. 1000 milliLiter(s) (50 mL/Hr) IV Continuous <Continuous>  dextrose 50% Injectable 12.5 Gram(s) IV Push once  dextrose 50% Injectable 25 Gram(s) IV Push once  dextrose 50% Injectable 25 Gram(s) IV Push once  enoxaparin Injectable 30 milliGRAM(s) SubCutaneous two times a day  gabapentin 100 milliGRAM(s) Oral three times a day  hydroxychloroquine   Oral   hydroxychloroquine 200 milliGRAM(s) Oral every 12 hours  insulin lispro (HumaLOG) corrective regimen sliding scale   SubCutaneous three times a day before meals  insulin lispro (HumaLOG) corrective regimen sliding scale   SubCutaneous at bedtime  lisinopril 20 milliGRAM(s) Oral daily  methylPREDNISolone sodium succinate Injectable 40 milliGRAM(s) IV Push every 12 hours  sodium chloride 0.9%. 1000 milliLiter(s) (50 mL/Hr) IV Continuous <Continuous>  ticagrelor 60 milliGRAM(s) Oral every 12 hours  zinc sulfate 220 milliGRAM(s) Oral daily      ALLERGIES: codeine (Nausea)      FAMILY HISTORY:  Family history of early CAD (Sibling)  Family history of diabetes mellitus (Sibling)      PHYSICAL EXAMINATION:  -----------------------------  T(C): 36.2 (04-04-20 @ 05:30), Max: 38.6 (04-03-20 @ 16:54)  HR: 68 (04-04-20 @ 05:30) (67 - 98)  BP: 127/55 (04-04-20 @ 05:30) (127/55 - 148/40)  RR: 20 (04-04-20 @ 05:30) (17 - 38)  SpO2: 95% (04-04-20 @ 05:30) (93% - 97%)  Wt(kg): --    04-03 @ 07:01  -  04-04 @ 07:00  --------------------------------------------------------  IN:    Oral Fluid: 100 mL    sodium chloride 0.9%.: 100 mL  Total IN: 200 mL    OUT:    Indwelling Catheter - Urethral: 1800 mL  Total OUT: 1800 mL    Total NET: -1600 mL            Constitutional: well developed, normal appearance, well groomed, well nourished, no deformities and no acute distress.   Eyes: the conjunctiva exhibited no abnormalities and the eyelids demonstrated no xanthelasmas.   HEENT: normal oral mucosa, no oral pallor and no oral cyanosis.   Neck: normal jugular venous A waves present, normal jugular venous V waves present and no jugular venous kerr A waves.   Pulmonary: no respiratory distress, normal respiratory rhythm and effort, no accessory muscle use and lungs were clear to auscultation bilaterally.   Cardiovascular: heart rate and rhythm were normal, normal S1 and S2 and no murmur, gallop, rub, heave or thrill are present.   Abdomen: soft, non-tender, no hepato-splenomegaly and no abdominal mass palpated.   Musculoskeletal: the gait could not be assessed..   Extremities: no clubbing of the fingernails, no localized cyanosis, no petechial hemorrhages and no ischemic changes.   Skin: normal skin color and pigmentation, no rash, no venous stasis, no skin lesions, no skin ulcer and no xanthoma was observed.   Psychiatric: oriented to person, place, and time, the affect was normal, the mood was normal and not feeling anxious.     ECG:  -------        LABS:   --------  04-02    129<L>  |  100  |  8   ----------------------------<  122<H>  4.2   |  19<L>  |  0.92    Ca    8.6      02 Apr 2020 23:36    TPro  7.0  /  Alb  2.5<L>  /  TBili  0.4  /  DBili  x   /  AST  54<H>  /  ALT  35  /  AlkPhos  59  04-02                         9.1    10.08 )-----------( 248      ( 02 Apr 2020 23:36 )             29.5     PT/INR - ( 02 Apr 2020 23:36 )   PT: 12.5 sec;   INR: 1.11 ratio         PTT - ( 02 Apr 2020 23:36 )  PTT:31.1 sec    04-02 @ 23:36 CPK total:--, CKMB --, Troponin I - <.015 ng/mL          RADIOLOGY REPORTS:  -----------------------------        ECHOCARDIOGRAM:  --------------------------- CARDIOLOGY CONSULTATION NOTE                                                                               NESTOR REILLY is a 79y Female with a PMH of CAD s/p 3 stents, HTN, DM, temporal arteritis, OA who presents to the ED for for 3 days of fever and cough.  Patient admitted for evaluation for COVID19.  Has had symptoms for 3 days.  Symptoms include fever, chills, cough, generalized weakness and increased dyspnea.  Cough has been productive of blood tinged sputum.  Reports multiple of her family members are sick as well.  Presents in moderate to severe respiratory distress.  Rapid response was called as ivyetn was saturating 75-80% on nonrebreather.  Started on nasal cannula as well, improved to 90%.  Decision was made to monitor.  CXR shows bilateral PNA.        REVIEW OF SYSTEMS:  -----------------------------    CONSTITUTIONAL: No fever, weight loss, or fatigue  EYES: No eye pain, visual disturbances, or discharge  ENMT:  No difficulty hearing, tinnitus, vertigo; No sinus or throat pain  NECK: No pain or stiffness  BREASTS: No pain, masses, or nipple discharge  RESPIRATORY: No cough, wheezing, chills or hemoptysis; No shortness of breath  CARDIOVASCULAR: See HPI  GASTROINTESTINAL: No abdominal or epigastric pain. No nausea, vomiting, or hematemesis; No diarrhea or constipation. No melena or hematochezia.  GENITOURINARY: No dysuria, frequency, hematuria, or incontinence  NEUROLOGICAL: No headaches, memory loss, loss of strength, numbness, or tremors  SKIN: No itching, burning, rashes, or lesions   LYMPH NODES: No enlarged glands  ENDOCRINE: No heat or cold intolerance; No hair loss  MUSCULOSKELETAL: No joint pain or swelling; No muscle, back, or extremity pain  PSYCHIATRIC: No depression, anxiety, mood swings, or difficulty sleeping  HEME/LYMPH: No easy bruising, or bleeding gums  ALLERGY AND IMMUNOLOGIC: No hives or eczema    Home Medications:  amLODIPine 5 mg oral tablet: 1 tab(s) orally once a day (02 Apr 2020 23:00)  aspirin 81 mg oral tablet: 1 tab(s) orally once a day (02 Apr 2020 23:00)  Brilinta (ticagrelor) 60 mg oral tablet: 1 tab(s) orally 2 times a day (02 Apr 2020 23:00)  Centrum oral tablet: 1 tab(s) orally once a day (02 Apr 2020 23:00)  Clarinex 5 mg oral tablet: 1 tab(s) orally once a day (02 Apr 2020 23:00)  clonazePAM:  (02 Apr 2020 23:00)  Dexilant 60 mg oral delayed release capsule: 1 cap(s) orally once a day (02 Apr 2020 23:00)  folic acid 1 mg oral tablet: 1 tab(s) orally once a day (02 Apr 2020 23:00)  gabapentin: 200 milligram(s) orally 3 times a day (02 Apr 2020 23:00)  insulin lispro 100 units/mL injectable solution (obsolete):  (02 Apr 2020 23:00)  isosorbide dinitrate 20 mg oral tablet: 1 tab(s) orally 2 times a day (02 Apr 2020 23:00)  Janumet 50 mg-1000 mg oral tablet: 1 tab(s) orally 2 times a day (02 Apr 2020 23:00)  Janumet 50 mg-1000 mg oral tablet: 1 tab(s) orally 2 times a day (02 Apr 2020 23:00)  lisinopril 20 mg oral tablet: 1 tab(s) orally once a day (02 Apr 2020 23:00)  nadolol 20 mg oral tablet: 2 tab(s) orally once a day (02 Apr 2020 23:00)  NovoLIN 70/30 subcutaneous suspension:  (02 Apr 2020 23:00)  Oysco 500 with D 500 mg-200 intl units (5 mcg) oral tablet: 1 tab(s) orally 2 times a day (02 Apr 2020 23:00)  Pataday 0.2% ophthalmic solution: 1 drop(s) to each affected eye once a day (02 Apr 2020 23:00)  predniSONE 2.5 mg oral tablet: 1 tab(s) orally once a day (at bedtime) (02 Apr 2020 23:00)  Trilipix 45 mg oral delayed release capsule: 1 cap(s) orally once a day (02 Apr 2020 23:00)  Vitamin B12 1000 mcg oral tablet: 1 tab(s) orally once a day (02 Apr 2020 23:00)  Vitamin C 1000 mg oral tablet: 1 tab(s) orally once a day (02 Apr 2020 23:00)      MEDICATIONS  (STANDING):  ascorbic acid 1000 milliGRAM(s) Oral two times a day  aspirin enteric coated 81 milliGRAM(s) Oral daily  atorvastatin 20 milliGRAM(s) Oral at bedtime  dextrose 5%. 1000 milliLiter(s) (50 mL/Hr) IV Continuous <Continuous>  dextrose 50% Injectable 12.5 Gram(s) IV Push once  dextrose 50% Injectable 25 Gram(s) IV Push once  dextrose 50% Injectable 25 Gram(s) IV Push once  enoxaparin Injectable 30 milliGRAM(s) SubCutaneous two times a day  gabapentin 100 milliGRAM(s) Oral three times a day  hydroxychloroquine   Oral   hydroxychloroquine 200 milliGRAM(s) Oral every 12 hours  insulin lispro (HumaLOG) corrective regimen sliding scale   SubCutaneous three times a day before meals  insulin lispro (HumaLOG) corrective regimen sliding scale   SubCutaneous at bedtime  lisinopril 20 milliGRAM(s) Oral daily  methylPREDNISolone sodium succinate Injectable 40 milliGRAM(s) IV Push every 12 hours  sodium chloride 0.9%. 1000 milliLiter(s) (50 mL/Hr) IV Continuous <Continuous>  ticagrelor 60 milliGRAM(s) Oral every 12 hours  zinc sulfate 220 milliGRAM(s) Oral daily      ALLERGIES: codeine (Nausea)      FAMILY HISTORY:  Family history of early CAD (Sibling)  Family history of diabetes mellitus (Sibling)      PHYSICAL EXAMINATION:  -----------------------------  T(C): 36.2 (04-04-20 @ 05:30), Max: 38.6 (04-03-20 @ 16:54)  HR: 68 (04-04-20 @ 05:30) (67 - 98)  BP: 127/55 (04-04-20 @ 05:30) (127/55 - 148/40)  RR: 20 (04-04-20 @ 05:30) (17 - 38)  SpO2: 95% (04-04-20 @ 05:30) (93% - 97%)  Wt(kg): --    04-03 @ 07:01  -  04-04 @ 07:00  --------------------------------------------------------  IN:    Oral Fluid: 100 mL    sodium chloride 0.9%.: 100 mL  Total IN: 200 mL    OUT:    Indwelling Catheter - Urethral: 1800 mL  Total OUT: 1800 mL    Total NET: -1600 mL            Constitutional: well developed, normal appearance, well groomed, well nourished, no deformities and no acute distress.   Eyes: the conjunctiva exhibited no abnormalities and the eyelids demonstrated no xanthelasmas.   HEENT: normal oral mucosa, no oral pallor and no oral cyanosis.   Neck: normal jugular venous A waves present, normal jugular venous V waves present and no jugular venous kerr A waves.   Pulmonary: no respiratory distress, normal respiratory rhythm and effort, no accessory muscle use and lungs were clear to auscultation bilaterally.   Cardiovascular: heart rate and rhythm were normal, normal S1 and S2 and no murmur, gallop, rub, heave or thrill are present.   Abdomen: soft, non-tender, no hepato-splenomegaly and no abdominal mass palpated.   Musculoskeletal: the gait could not be assessed..   Extremities: no clubbing of the fingernails, no localized cyanosis, no petechial hemorrhages and no ischemic changes.   Skin: normal skin color and pigmentation, no rash, no venous stasis, no skin lesions, no skin ulcer and no xanthoma was observed.   Psychiatric: oriented to person, place, and time, the affect was normal, the mood was normal and not feeling anxious.     ECG:  -------  < from: 12 Lead ECG (04.03.20 @ 11:50) >    Ventricular Rate 95 BPM    Atrial Rate 95 BPM    P-R Interval 138 ms    QRS Duration 98 ms    Q-T Interval 374 ms    QTC Calculation(Bezet) 469 ms    P Axis 42 degrees    R Axis -34 degrees    T Axis 50 degrees    Diagnosis Line Normal sinus rhythm  Left axis deviation  Incomplete right bundle branch block  Nonspecific ST and T wave abnormality  Abnormal ECG  When compared with ECG of 03-APR-2020 01:12,  fusion complexes are no longer present  premature ventricular complexes are no longer present  Incomplete right bundle branch block is now present  Confirmed by Emil Lucero MD [id number]) on 4/4/2020 9:59:41 AM    < end of copied text >    < from: 12 Lead ECG (04.03.20 @ 01:06) >    Ventricular Rate 94 BPM    Atrial Rate 72 BPM    QRS Duration 162 ms    Q-T Interval 436 ms    QTC Calculation(Bezet) 545 ms    R Axis 107 degrees    T Axis -60 degrees    Diagnosis Line Wide QRS rhythm  Nonspecific intraventricular block  PossibleRight ventricular hypertrophy  Lateral infarct , age undetermined  Marked T wave abnormality, consider inferior ischemia  Marked T wave abnormality, consider anterior ischemia  Abnormal ECG  When compared with ECG of 02-APR-2020 23:45,  Wide QRS rhythm has replaced Sinus rhythm  Confirmed by Emil Lucero MD ([id number]) on 4/4/2020 9:58:23 AM    < end of copied text >        LABS:   --------  04-02    129<L>  |  100  |  8   ----------------------------<  122<H>  4.2   |  19<L>  |  0.92    Ca    8.6      02 Apr 2020 23:36    TPro  7.0  /  Alb  2.5<L>  /  TBili  0.4  /  DBili  x   /  AST  54<H>  /  ALT  35  /  AlkPhos  59  04-02                         9.1    10.08 )-----------( 248      ( 02 Apr 2020 23:36 )             29.5     PT/INR - ( 02 Apr 2020 23:36 )   PT: 12.5 sec;   INR: 1.11 ratio         PTT - ( 02 Apr 2020 23:36 )  PTT:31.1 sec    04-02 @ 23:36 CPK total:--, CKMB --, Troponin I - <.015 ng/mL          RADIOLOGY REPORTS:  -----------------------------  < from: Xray Chest 1 View AP/PA. (04.03.20 @ 00:23) >    EXAM:  XR CHEST AP OR PA 1V                            PROCEDURE DATE:  04/03/2020          INTERPRETATION:  PORTABLE CHEST X-RAY    HISTORY: 79 years old. Female. Shortness of Breath, Cough, Fever.    COMPARISON: 12/30/2017.    FINDINGS:  Moderatepatchy bilateral airspace opacities, relatively sparing the left apex.  No pneumothorax or pleural effusion.   The cardiac silhouette is not accurately assessed by AP technique.    IMPRESSION:  Moderate patchy bilateral airspace opacities, relativelysparing the left apex. Findings may represent infection from atypical or typical agents, including viral pneumonia due to atypical agents (COVID-19 pneumonia).                MARY BERNARDO MD., ATTENDING RADIOLOGIST  This document has been electronicallysigned. Apr  3 2020 12:24AM                < end of copied text >        ECHOCARDIOGRAM:  ---------------------------

## 2020-04-04 NOTE — PROGRESS NOTE ADULT - SUBJECTIVE AND OBJECTIVE BOX
Patient is a 79y old  Female who presents with a chief complaint of dyspnea (2020 07:50)  Clinically improved.  NSTMI with elevated troponin/   covid PNA  Cardiology notes reviewed.      INTERVAL HPI/OVERNIGHT EVENTS:  PAST MEDICAL & SURGICAL HISTORY:  Stented coronary artery  Arthritis  DM (diabetes mellitus)  CAD (coronary artery disease)  Hypertension  Temporal arteritis  H/O:   3-vessel CAD      MEDICATIONS  (STANDING):  ascorbic acid 1000 milliGRAM(s) Oral two times a day  aspirin enteric coated 81 milliGRAM(s) Oral daily  atorvastatin 20 milliGRAM(s) Oral at bedtime  dextrose 5%. 1000 milliLiter(s) (50 mL/Hr) IV Continuous <Continuous>  dextrose 50% Injectable 12.5 Gram(s) IV Push once  dextrose 50% Injectable 25 Gram(s) IV Push once  dextrose 50% Injectable 25 Gram(s) IV Push once  enoxaparin Injectable 30 milliGRAM(s) SubCutaneous two times a day  gabapentin 100 milliGRAM(s) Oral three times a day  heparin  Infusion.  Unit(s)/Hr (9 mL/Hr) IV Continuous <Continuous>  hydroxychloroquine   Oral   hydroxychloroquine 200 milliGRAM(s) Oral every 12 hours  insulin lispro (HumaLOG) corrective regimen sliding scale   SubCutaneous three times a day before meals  insulin lispro (HumaLOG) corrective regimen sliding scale   SubCutaneous at bedtime  lisinopril 20 milliGRAM(s) Oral daily  methylPREDNISolone sodium succinate Injectable 40 milliGRAM(s) IV Push every 12 hours  metoprolol tartrate 25 milliGRAM(s) Oral two times a day  sodium chloride 0.9%. 1000 milliLiter(s) (50 mL/Hr) IV Continuous <Continuous>  ticagrelor 60 milliGRAM(s) Oral every 12 hours  zinc sulfate 220 milliGRAM(s) Oral daily    MEDICATIONS  (PRN):  acetaminophen   Tablet .. 650 milliGRAM(s) Oral every 4 hours PRN Temp greater or equal to 38.5C (101.3F)  dextrose 40% Gel 15 Gram(s) Oral once PRN Blood Glucose LESS THAN 70 milliGRAM(s)/deciliter  glucagon  Injectable 1 milliGRAM(s) IntraMuscular once PRN Glucose LESS THAN 70 milligrams/deciliter  heparin  Injectable 4400 Unit(s) IV Push every 6 hours PRN For aPTT less than 40      Allergies    codeine (Nausea)    Intolerances        REVIEW OF SYSTEMS:  CONSTITUTIONAL: No fever, weight loss, or fatigue  EYES: No eye pain, visual disturbances, or discharge  ENMT:  No difficulty hearing, tinnitus, vertigo; No sinus or throat pain  NECK: No pain or stiffness  BREASTS: No pain, masses, or nipple discharge  RESPIRATORY: No cough, wheezing, chills or hemoptysis; No shortness of breath  CARDIOVASCULAR: No chest pain, palpitations, dizziness, or leg swelling  GASTROINTESTINAL: No abdominal or epigastric pain. No nausea, vomiting, or hematemesis; No diarrhea or constipation. No melena or hematochezia.  GENITOURINARY: No dysuria, frequency, hematuria, or incontinence  NEUROLOGICAL: No headaches, memory loss, loss of strength, numbness, or tremors  SKIN: No itching, burning, rashes, or lesions   LYMPH NODES: No enlarged glands  ENDOCRINE: No heat or cold intolerance; No hair loss  MUSCULOSKELETAL: No joint pain or swelling; No muscle, back, or extremity pain  PSYCHIATRIC: No depression, anxiety, mood swings, or difficulty sleeping  HEME/LYMPH: No easy bruising, or bleeding gums  ALLERY AND IMMUNOLOGIC: No hives or eczema    Vital Signs Last 24 Hrs  T(C): 36.7 (2020 11:47), Max: 38.6 (2020 16:54)  T(F): 98 (2020 11:47), Max: 101.5 (2020 16:54)  HR: 67 (2020 11:47) (67 - 92)  BP: 135/56 (2020 11:47) (127/55 - 140/95)  BP(mean): --  RR: 20 (2020 11:47) (20 - 30)  SpO2: 96% (2020 11:47) (95% - 97%)    PHYSICAL EXAM:  GENERAL: NAD, well-groomed, well-developed  HEAD:  Atraumatic, Normocephalic  EYES: EOMI, PERRLA, conjunctiva and sclera clear  ENMT: No tonsillar erythema, exudates, or enlargement; Moist mucous membranes, Good dentition, No lesions  NECK: Supple, No JVD, Normal thyroid  NERVOUS SYSTEM:  Alert & Oriented X3, Good concentration; Motor Strength 5/5 B/L upper and lower extremities; DTRs 2+ intact and symmetric  CHEST/LUNG: Clear to percussion bilaterally; No rales, rhonchi, wheezing, or rubs  HEART: Regular rate and rhythm; No murmurs, rubs, or gallops  ABDOMEN: Soft, Nontender, Nondistended; Bowel sounds present  EXTREMITIES:  2+ Peripheral Pulses, No clubbing, cyanosis, or edema  LYMPH: No lymphadenopathy noted  SKIN: No rashes or lesions    LABS:                        10.8   14.39 )-----------( 296      ( 2020 07:54 )             35.4     04-04    131<L>  |  98  |  20  ----------------------------<  257<H>  4.3   |  19<L>  |  1.19    Ca    8.5      2020 07:54    TPro  7.3  /  Alb  2.1<L>  /  TBili  0.5  /  DBili  x   /  AST  88<H>  /  ALT  42  /  AlkPhos  73  04-04      PT/INR - ( 2020 10:00 )   PT: 14.3 sec;   INR: 1.27 ratio         PTT - ( 2020 10:00 )  PTT:41.3 sec    CAPILLARY BLOOD GLUCOSE      POCT Blood Glucose.: 244 mg/dL (2020 08:07)  POCT Blood Glucose.: 211 mg/dL (2020 21:59)  POCT Blood Glucose.: 176 mg/dL (2020 17:25)    ABG - ( 2020 02:02 )  pH, Arterial: x     pH, Blood: 7.40  /  pCO2: 26    /  pO2: 55    / HCO3: 16    / Base Excess: -7.6  /  SaO2: 88                CARDIAC MARKERS ( 2020 07:54 )  6.460 ng/mL / x     / 198 U/L / x     / x      CARDIAC MARKERS ( 2020 23:36 )  <.015 ng/mL / x     / x     / x     / x                RADIOLOGY & ADDITIONAL TESTS:    Imaging Personally Reviewed:  [ ] YES  [ ] NO    Consultant(s) Notes Reviewed:  [ ] YES  [ ] NO    Care Discussed with Consultants/Other Providers [ ] YES  [ ] NO    Care discussed with family,         [  ]   yes  [  ]  No    imp:    stable[ ]    unstable[  ]     improving [x   ]       unchanged  [  ]                Plans:  Continue present plans  [ x ] as per cardiology. will continur hydroxycholoquin  and monitor Qt               New consult [  ]   specialty  .......               order test[  ]    test name.                  Discharge Planning  [  ]

## 2020-04-05 NOTE — PROGRESS NOTE ADULT - SUBJECTIVE AND OBJECTIVE BOX
CARDIOLOGY PROGRESS NOTE                                                                               NESTOR REILLY is a 79y Female with a PMH of CAD s/p 3 stents, HTN, DM, temporal arteritis, OA who presents to the ED for for 3 days of fever and cough.  Patient admitted for evaluation for COVID19.      O/N: Remains in mild distress, but adequate sats, afebrile.    REVIEW OF SYSTEMS:  -----------------------------    CONSTITUTIONAL: No fever, weight loss, or fatigue  EYES: No eye pain, visual disturbances, or discharge  ENMT:  No difficulty hearing, tinnitus, vertigo; No sinus or throat pain  NECK: No pain or stiffness  BREASTS: No pain, masses, or nipple discharge  RESPIRATORY: No cough, wheezing, chills or hemoptysis; No shortness of breath  CARDIOVASCULAR: See HPI  GASTROINTESTINAL: No abdominal or epigastric pain. No nausea, vomiting, or hematemesis; No diarrhea or constipation. No melena or hematochezia.  GENITOURINARY: No dysuria, frequency, hematuria, or incontinence  NEUROLOGICAL: No headaches, memory loss, loss of strength, numbness, or tremors  SKIN: No itching, burning, rashes, or lesions   LYMPH NODES: No enlarged glands  ENDOCRINE: No heat or cold intolerance; No hair loss  MUSCULOSKELETAL: No joint pain or swelling; No muscle, back, or extremity pain  PSYCHIATRIC: No depression, anxiety, mood swings, or difficulty sleeping  HEME/LYMPH: No easy bruising, or bleeding gums  ALLERGY AND IMMUNOLOGIC: No hives or eczema    Home Medications:  amLODIPine 5 mg oral tablet: 1 tab(s) orally once a day (02 Apr 2020 23:00)  aspirin 81 mg oral tablet: 1 tab(s) orally once a day (02 Apr 2020 23:00)  Brilinta (ticagrelor) 60 mg oral tablet: 1 tab(s) orally 2 times a day (02 Apr 2020 23:00)  Centrum oral tablet: 1 tab(s) orally once a day (02 Apr 2020 23:00)  Clarinex 5 mg oral tablet: 1 tab(s) orally once a day (02 Apr 2020 23:00)  clonazePAM:  (02 Apr 2020 23:00)  Dexilant 60 mg oral delayed release capsule: 1 cap(s) orally once a day (02 Apr 2020 23:00)  folic acid 1 mg oral tablet: 1 tab(s) orally once a day (02 Apr 2020 23:00)  gabapentin: 200 milligram(s) orally 3 times a day (02 Apr 2020 23:00)  insulin lispro 100 units/mL injectable solution (obsolete):  (02 Apr 2020 23:00)  isosorbide dinitrate 20 mg oral tablet: 1 tab(s) orally 2 times a day (02 Apr 2020 23:00)  Janumet 50 mg-1000 mg oral tablet: 1 tab(s) orally 2 times a day (02 Apr 2020 23:00)  Janumet 50 mg-1000 mg oral tablet: 1 tab(s) orally 2 times a day (02 Apr 2020 23:00)  lisinopril 20 mg oral tablet: 1 tab(s) orally once a day (02 Apr 2020 23:00)  nadolol 20 mg oral tablet: 2 tab(s) orally once a day (02 Apr 2020 23:00)  NovoLIN 70/30 subcutaneous suspension:  (02 Apr 2020 23:00)  Oysco 500 with D 500 mg-200 intl units (5 mcg) oral tablet: 1 tab(s) orally 2 times a day (02 Apr 2020 23:00)  Pataday 0.2% ophthalmic solution: 1 drop(s) to each affected eye once a day (02 Apr 2020 23:00)  predniSONE 2.5 mg oral tablet: 1 tab(s) orally once a day (at bedtime) (02 Apr 2020 23:00)  Trilipix 45 mg oral delayed release capsule: 1 cap(s) orally once a day (02 Apr 2020 23:00)  Vitamin B12 1000 mcg oral tablet: 1 tab(s) orally once a day (02 Apr 2020 23:00)  Vitamin C 1000 mg oral tablet: 1 tab(s) orally once a day (02 Apr 2020 23:00)      MEDICATIONS  (STANDING):  MEDICATIONS  (STANDING):  ascorbic acid 1000 milliGRAM(s) Oral two times a day  aspirin enteric coated 81 milliGRAM(s) Oral daily  atorvastatin 20 milliGRAM(s) Oral at bedtime  dextrose 5%. 1000 milliLiter(s) (50 mL/Hr) IV Continuous <Continuous>  dextrose 50% Injectable 12.5 Gram(s) IV Push once  dextrose 50% Injectable 25 Gram(s) IV Push once  dextrose 50% Injectable 25 Gram(s) IV Push once  enoxaparin Injectable 30 milliGRAM(s) SubCutaneous two times a day  gabapentin 100 milliGRAM(s) Oral three times a day  heparin  Infusion.  Unit(s)/Hr (9 mL/Hr) IV Continuous <Continuous>  hydroxychloroquine   Oral   hydroxychloroquine 200 milliGRAM(s) Oral every 12 hours  insulin glargine Injectable (LANTUS) 20 Unit(s) SubCutaneous at bedtime  insulin lispro (HumaLOG) corrective regimen sliding scale   SubCutaneous three times a day before meals  insulin lispro (HumaLOG) corrective regimen sliding scale   SubCutaneous at bedtime  lisinopril 20 milliGRAM(s) Oral daily  methylPREDNISolone sodium succinate Injectable 40 milliGRAM(s) IV Push every 12 hours  metoprolol tartrate 25 milliGRAM(s) Oral two times a day  nystatin    Suspension 746294 Unit(s) Oral four times a day  senna 2 Tablet(s) Oral at bedtime  sodium chloride 0.9%. 1000 milliLiter(s) (50 mL/Hr) IV Continuous <Continuous>  ticagrelor 60 milliGRAM(s) Oral every 12 hours  zinc sulfate 220 milliGRAM(s) Oral daily      FAMILY HISTORY:  Family history of early CAD (Sibling)  Family history of diabetes mellitus (Sibling)      PHYSICAL EXAMINATION:  -----------------------------  Vital Signs Last 24 Hrs  T(C): 36.1 (05 Apr 2020 11:51), Max: 37.6 (04 Apr 2020 17:11)  T(F): 97 (05 Apr 2020 11:51), Max: 99.6 (04 Apr 2020 17:11)  HR: 73 (05 Apr 2020 11:51) (73 - 77)  BP: 129/57 (05 Apr 2020 11:51) (107/46 - 157/69)  BP(mean): --  RR: 22 (05 Apr 2020 11:51) (18 - 31)  SpO2: 99% (05 Apr 2020 11:51) (97% - 100%)    P/E as per internal medicine exam of 4/5 due to current pandemic.      ECG:  -------  < from: 12 Lead ECG (04.04.20 @ 10:06) >    Ventricular Rate 72 BPM    Atrial Rate 72 BPM    P-R Interval 146 ms    QRS Duration 82 ms    Q-T Interval 500 ms    QTC Calculation(Bezet) 547 ms    P Axis 19 degrees    R Axis 14 degrees    T Axis 55 degrees    Diagnosis Line Normal sinus rhythm  Nonspecific ST abnormality  Prolonged QT  Abnormal ECG  When compared with ECG of 03-APR-2020 11:50,  Incomplete right bundle branch block is no longer present  Confirmed by Siri EVANS, Jaspal ([id number]) on 4/4/2020 9:52:31 PM    < end of copied text >        LABS:   --------  04-02    129<L>  |  100  |  8   ----------------------------<  122<H>  4.2   |  19<L>  |  0.92    Ca    8.6      02 Apr 2020 23:36    TPro  7.0  /  Alb  2.5<L>  /  TBili  0.4  /  DBili  x   /  AST  54<H>  /  ALT  35  /  AlkPhos  59  04-02                         9.1    10.08 )-----------( 248      ( 02 Apr 2020 23:36 )             29.5     PT/INR - ( 02 Apr 2020 23:36 )   PT: 12.5 sec;   INR: 1.11 ratio         PTT - ( 02 Apr 2020 23:36 )  PTT:31.1 sec    04-02 @ 23:36 CPK total:--, CKMB --, Troponin I - <.015 ng/mL          RADIOLOGY REPORTS:  -----------------------------  < from: Xray Chest 1 View AP/PA. (04.03.20 @ 00:23) >    EXAM:  XR CHEST AP OR PA 1V                            PROCEDURE DATE:  04/03/2020          INTERPRETATION:  PORTABLE CHEST X-RAY    HISTORY: 79 years old. Female. Shortness of Breath, Cough, Fever.    COMPARISON: 12/30/2017.    FINDINGS:  Moderatepatchy bilateral airspace opacities, relatively sparing the left apex.  No pneumothorax or pleural effusion.   The cardiac silhouette is not accurately assessed by AP technique.    IMPRESSION:  Moderate patchy bilateral airspace opacities, relatively sparing the left apex. Findings may represent infection from atypical or typical agents, including viral pneumonia due to atypical agents (COVID-19 pneumonia).                MARY BERNARDO MD., ATTENDING RADIOLOGIST  This document has been electronicallysigned. Apr  3 2020 12:24AM                < end of copied text >

## 2020-04-05 NOTE — PROGRESS NOTE ADULT - SUBJECTIVE AND OBJECTIVE BOX
Patient is a 79y old  Female who presents with a chief complaint of dyspnea (2020 12:09)  experienced chest pain yesterday relieved with s/l NTG. None today   has pain in the mouth, oral jose raul is ensitive   constipated for 10 days.   denies acute dyspnea.   sfebrile    INTERVAL HPI/OVERNIGHT EVENTS:  PAST MEDICAL & SURGICAL HISTORY:  Stented coronary artery  Arthritis  DM (diabetes mellitus)  CAD (coronary artery disease)  Hypertension  Temporal arteritis  H/O:   3-vessel CAD      MEDICATIONS  (STANDING):  ascorbic acid 1000 milliGRAM(s) Oral two times a day  aspirin enteric coated 81 milliGRAM(s) Oral daily  atorvastatin 20 milliGRAM(s) Oral at bedtime  dextrose 5%. 1000 milliLiter(s) (50 mL/Hr) IV Continuous <Continuous>  dextrose 50% Injectable 12.5 Gram(s) IV Push once  dextrose 50% Injectable 25 Gram(s) IV Push once  dextrose 50% Injectable 25 Gram(s) IV Push once  enoxaparin Injectable 30 milliGRAM(s) SubCutaneous two times a day  gabapentin 100 milliGRAM(s) Oral three times a day  heparin  Infusion.  Unit(s)/Hr (9 mL/Hr) IV Continuous <Continuous>  hydroxychloroquine   Oral   hydroxychloroquine 200 milliGRAM(s) Oral every 12 hours  insulin glargine Injectable (LANTUS) 20 Unit(s) SubCutaneous at bedtime  insulin lispro (HumaLOG) corrective regimen sliding scale   SubCutaneous three times a day before meals  insulin lispro (HumaLOG) corrective regimen sliding scale   SubCutaneous at bedtime  lisinopril 20 milliGRAM(s) Oral daily  methylPREDNISolone sodium succinate Injectable 40 milliGRAM(s) IV Push every 12 hours  metoprolol tartrate 25 milliGRAM(s) Oral two times a day  nystatin    Suspension 817098 Unit(s) Oral four times a day  senna 2 Tablet(s) Oral at bedtime  sodium chloride 0.9%. 1000 milliLiter(s) (50 mL/Hr) IV Continuous <Continuous>  ticagrelor 60 milliGRAM(s) Oral every 12 hours  zinc sulfate 220 milliGRAM(s) Oral daily    MEDICATIONS  (PRN):  acetaminophen   Tablet .. 650 milliGRAM(s) Oral every 4 hours PRN Temp greater or equal to 38.5C (101.3F)  bisacodyl Suppository 10 milliGRAM(s) Rectal daily PRN Constipation  dextrose 40% Gel 15 Gram(s) Oral once PRN Blood Glucose LESS THAN 70 milliGRAM(s)/deciliter  glucagon  Injectable 1 milliGRAM(s) IntraMuscular once PRN Glucose LESS THAN 70 milligrams/deciliter  heparin  Injectable 4400 Unit(s) IV Push every 6 hours PRN For aPTT less than 40  magnesium hydroxide Suspension 30 milliLiter(s) Oral daily PRN Constipation  nitroglycerin     SubLingual 0.4 milliGRAM(s) SubLingual every 5 minutes PRN Chest Pain      Allergies    codeine (Nausea)    Intolerances        REVIEW OF SYSTEMS:  CONSTITUTIONAL: No fever, weight loss, or fatigue  EYES: No eye pain, visual disturbances, or discharge  ENMT:  No difficulty hearing, tinnitus, vertigo; No sinus or throat pain  NECK: No pain or stiffness  BREASTS: No pain, masses, or nipple discharge  RESPIRATORY: No cough, wheezing, chills or hemoptysis; No shortness of breath  CARDIOVASCULAR: No chest pain, palpitations, dizziness, or leg swelling  GASTROINTESTINAL: No abdominal or epigastric pain. No nausea, vomiting, or hematemesis; No diarrhea or constipation. No melena or hematochezia.  GENITOURINARY: No dysuria, frequency, hematuria, or incontinence  NEUROLOGICAL: No headaches, memory loss, loss of strength, numbness, or tremors  SKIN: No itching, burning, rashes, or lesions   LYMPH NODES: No enlarged glands  ENDOCRINE: No heat or cold intolerance; No hair loss  MUSCULOSKELETAL: No joint pain or swelling; No muscle, back, or extremity pain  PSYCHIATRIC: No depression, anxiety, mood swings, or difficulty sleeping  HEME/LYMPH: No easy bruising, or bleeding gums  ALLERY AND IMMUNOLOGIC: No hives or eczema    Vital Signs Last 24 Hrs  T(C): 36.4 (2020 05:35), Max: 37.6 (2020 17:11)  T(F): 97.5 (2020 05:35), Max: 99.6 (2020 17:11)  HR: 73 (2020 05:35) (67 - 77)  BP: 157/69 (2020 05:35) (107/46 - 157/69)  BP(mean): --  RR: 22 (2020 05:35) (18 - 31)  SpO2: 100% (2020 05:35) (96% - 100%)    PHYSICAL EXAM:  GENERAL: NAD, well-groomed, well-developed  HEAD:  Atraumatic, Normocephalic  EYES: EOMI, PERRLA, conjunctiva and sclera clear  ENMT: No tonsillar erythema, exudates, or enlargement; Moist mucous membranes, Good dentition, No lesions  NECK: Supple, No JVD, Normal thyroid  NERVOUS SYSTEM:  Alert & Oriented X3, Good concentration; Motor Strength 5/5 B/L upper and lower extremities; DTRs 2+ intact and symmetric  CHEST/LUNG: Clear to percussion bilaterally; No rales, rhonchi, wheezing, or rubs  HEART: Regular rate and rhythm; No murmurs, rubs, or gallops  ABDOMEN: Soft, Nontender, Nondistended; Bowel sounds present  EXTREMITIES:  2+ Peripheral Pulses, No clubbing, cyanosis, or edema  LYMPH: No lymphadenopathy noted  SKIN: No rashes or lesions    LABS:                        10.7   19.72 )-----------( 327      ( 2020 06:45 )             33.9     04-05    128<L>  |  98  |  33<H>  ----------------------------<  342<H>  4.8   |  20<L>  |  1.31<H>    Ca    8.4<L>      2020 06:45    TPro  7.1  /  Alb  2.1<L>  /  TBili  0.4  /  DBili  x   /  AST  72<H>  /  ALT  41  /  AlkPhos  82  04-05      PT/INR - ( 2020 10:00 )   PT: 14.3 sec;   INR: 1.27 ratio         PTT - ( 2020 02:38 )  PTT:98.9 sec    CAPILLARY BLOOD GLUCOSE      POCT Blood Glucose.: 393 mg/dL (2020 11:07)  POCT Blood Glucose.: 389 mg/dL (2020 07:58)  POCT Blood Glucose.: 246 mg/dL (2020 21:13)  POCT Blood Glucose.: 224 mg/dL (2020 16:09)  POCT Blood Glucose.: 238 mg/dL (2020 12:00)      CARDIAC MARKERS ( 2020 02:38 )  4.880 ng/mL / x     / x     / x     / x      CARDIAC MARKERS ( 2020 17:08 )  6.520 ng/mL / x     / x     / x     / x      CARDIAC MARKERS ( 2020 07:54 )  6.460 ng/mL / x     / 198 U/L / x     / x          Hemoglobin A1C, Whole Blood: 7.8 % ( @ 11:35)        RADIOLOGY & ADDITIONAL TESTS:    Imaging Personally Reviewed:  [ ] YES  [ ] NO    Consultant(s) Notes Reviewed:  [ ] YES  [ ] NO    Care Discussed with Consultants/Other Providers [ ] YES  [ ] NO    Care discussed with family,         [  ]   yes  [  ]  No    imp:    stable[ ]    unstable[  ]     improving [  x ]       unchanged  [  ]                Plans:  Continue present plans  [x  ] s per cardiology. add oral Nystatin, swish and swalklow               New consult [  ]   specialty  .......               order test[  ]    test name.                  Discharge Planning  [  ]

## 2020-04-05 NOTE — PROGRESS NOTE ADULT - ASSESSMENT
78yo F with bilateral pneumonia sec to COVID 19.    Known h/o CAD s/p PTCA, HTN, DM, temporal arteritis, OA.    cardiac enzymes downtrending and ECG otherwise shows no new changes. Increased QTc noted.  On ASA, statin, ACEI. Was on CACB and bbl at home; suggest restart low dose bbl for now.    Hemodynamically stable, otherwise no cardiac symptoms for now.  No indication for further interventions at present, will consider need/type of risk stratification after resolution of COVID infection.  Monitor QTc for increase, consider hanging to once daily Plaquenil as per most recent guidelines.

## 2020-04-06 NOTE — PROGRESS NOTE ADULT - SUBJECTIVE AND OBJECTIVE BOX
Patient is a 79y old  Female who presents with a chief complaint of dyspnea (2020 09:25)  clinically dehydrated today, constipated   still on non rebreather.       INTERVAL HPI/OVERNIGHT EVENTS:  PAST MEDICAL & SURGICAL HISTORY:  Stented coronary artery  Arthritis  DM (diabetes mellitus)  CAD (coronary artery disease)  Hypertension  Temporal arteritis  H/O:   3-vessel CAD      MEDICATIONS  (STANDING):  ascorbic acid 1000 milliGRAM(s) Oral two times a day  aspirin enteric coated 81 milliGRAM(s) Oral daily  atorvastatin 20 milliGRAM(s) Oral at bedtime  dextrose 5%. 1000 milliLiter(s) (50 mL/Hr) IV Continuous <Continuous>  dextrose 50% Injectable 12.5 Gram(s) IV Push once  dextrose 50% Injectable 25 Gram(s) IV Push once  dextrose 50% Injectable 25 Gram(s) IV Push once  gabapentin 100 milliGRAM(s) Oral three times a day  heparin  Infusion.  Unit(s)/Hr (9 mL/Hr) IV Continuous <Continuous>  hydroxychloroquine   Oral   hydroxychloroquine 200 milliGRAM(s) Oral every 12 hours  insulin glargine Injectable (LANTUS) 20 Unit(s) SubCutaneous at bedtime  insulin lispro (HumaLOG) corrective regimen sliding scale   SubCutaneous three times a day before meals  insulin lispro (HumaLOG) corrective regimen sliding scale   SubCutaneous at bedtime  lisinopril 20 milliGRAM(s) Oral daily  methylPREDNISolone sodium succinate Injectable 40 milliGRAM(s) IV Push every 12 hours  metoprolol tartrate 25 milliGRAM(s) Oral two times a day  nystatin    Suspension [id number] Unit(s) Oral four times a day  senna 2 Tablet(s) Oral at bedtime  sodium chloride 0.9% Bolus 1000 milliLiter(s) IV Bolus once  sodium chloride 0.9%. 1000 milliLiter(s) (50 mL/Hr) IV Continuous <Continuous>  ticagrelor 60 milliGRAM(s) Oral every 12 hours  zinc sulfate 220 milliGRAM(s) Oral daily    MEDICATIONS  (PRN):  acetaminophen   Tablet .. 650 milliGRAM(s) Oral every 4 hours PRN Temp greater or equal to 38.5C (101.3F)  bisacodyl Suppository 10 milliGRAM(s) Rectal daily PRN Constipation  dextrose 40% Gel 15 Gram(s) Oral once PRN Blood Glucose LESS THAN 70 milliGRAM(s)/deciliter  glucagon  Injectable 1 milliGRAM(s) IntraMuscular once PRN Glucose LESS THAN 70 milligrams/deciliter  heparin  Injectable 4400 Unit(s) IV Push every 6 hours PRN For aPTT less than 40  magnesium hydroxide Suspension 30 milliLiter(s) Oral daily PRN Constipation  nitroglycerin     SubLingual 0.4 milliGRAM(s) SubLingual every 5 minutes PRN Chest Pain      Allergies    codeine (Nausea)    Intolerances        REVIEW OF SYSTEMS:  CONSTITUTIONAL: No fever, weight loss, or fatigue  EYES: No eye pain, visual disturbances, or discharge  ENMT:  No difficulty hearing, tinnitus, vertigo; No sinus or throat pain  NECK: No pain or stiffness  BREASTS: No pain, masses, or nipple discharge  RESPIRATORY: No cough, wheezing, chills or hemoptysis; No shortness of breath  CARDIOVASCULAR: No chest pain, palpitations, dizziness, or leg swelling  GASTROINTESTINAL: No abdominal or epigastric pain. No nausea, vomiting, or hematemesis; No diarrhea or constipation. No melena or hematochezia.  GENITOURINARY: No dysuria, frequency, hematuria, or incontinence  NEUROLOGICAL: No headaches, memory loss, loss of strength, numbness, or tremors  SKIN: No itching, burning, rashes, or lesions   LYMPH NODES: No enlarged glands  ENDOCRINE: No heat or cold intolerance; No hair loss  MUSCULOSKELETAL: No joint pain or swelling; No muscle, back, or extremity pain  PSYCHIATRIC: No depression, anxiety, mood swings, or difficulty sleeping  HEME/LYMPH: No easy bruising, or bleeding gums  ALLERY AND IMMUNOLOGIC: No hives or eczema    Vital Signs Last 24 Hrs  T(C): 38.1 (2020 08:35), Max: 38.1 (2020 08:35)  T(F): 100.5 (2020 08:35), Max: 100.5 (2020 08:35)  HR: 86 (2020 06:00) (82 - 86)  BP: 156/86 (2020 06:00) (140/58 - 185/73)  BP(mean): --  RR: 22 (2020 06:00) (22 - 22)  SpO2: 98% (2020 06:00) (97% - 98%)    PHYSICAL EXAM:  GENERAL: NAD, well-groomed, well-developed  HEAD:  Atraumatic, Normocephalic  EYES: EOMI, PERRLA, conjunctiva and sclera clear  ENMT: No tonsillar erythema, exudates, or enlargement; Moist mucous membranes, Good dentition, No lesions  NECK: Supple, No JVD, Normal thyroid  NERVOUS SYSTEM:  Alert & Oriented X3, Good concentration; Motor Strength 5/5 B/L upper and lower extremities; DTRs 2+ intact and symmetric  CHEST/LUNG: Clear to percussion bilaterally; No rales, rhonchi, wheezing, or rubs  HEART: Regular rate and rhythm; No murmurs, rubs, or gallops  ABDOMEN: Soft, Nontender, Nondistended; Bowel sounds present  EXTREMITIES:  2+ Peripheral Pulses, No clubbing, cyanosis, or edema  LYMPH: No lymphadenopathy noted  SKIN: No rashes or lesions    LABS:                        10.7   19.72 )-----------( 327      ( 2020 06:45 )             33.9     04-05    128<L>  |  98  |  33<H>  ----------------------------<  342<H>  4.8   |  20<L>  |  1.31<H>    Ca    8.4<L>      2020 06:45    TPro  7.1  /  Alb  2.1<L>  /  TBili  0.4  /  DBili  x   /  AST  72<H>  /  ALT  41  /  AlkPhos  82  04-05      PTT - ( 2020 07:38 )  PTT:52.2 sec    CAPILLARY BLOOD GLUCOSE      POCT Blood Glucose.: 340 mg/dL (2020 11:21)  POCT Blood Glucose.: 402 mg/dL (2020 09:51)  POCT Blood Glucose.: 401 mg/dL (2020 08:30)  POCT Blood Glucose.: 287 mg/dL (2020 23:25)  POCT Blood Glucose.: 298 mg/dL (2020 16:48)      CARDIAC MARKERS ( 2020 06:45 )  2.840 ng/mL / x     / x     / x     / x      CARDIAC MARKERS ( 2020 02:38 )  4.880 ng/mL / x     / x     / x     / x      CARDIAC MARKERS ( 2020 17:08 )  6.520 ng/mL / x     / x     / x     / x          Hemoglobin A1C, Whole Blood: 7.8 % ( @ 11:35)        RADIOLOGY & ADDITIONAL TESTS:    Imaging Personally Reviewed:  [ ] YES  [ ] NO    Consultant(s) Notes Reviewed:  [ ] YES  [ ] NO    Care Discussed with Consultants/Other Providers [ ] YES  [ ] NO    Care discussed with family,         [  ]   yes  [  ]  No    imp:    stable[ ]    unstable[  x]     improving [   ]       unchanged  [  ]                Plans:  Continue present plans  [ x ] oxygen, fluids, cardiology follow up. . Iv fluid bolus               New consult [  ]   specialty  .......               order test[  ]    test name.                  Discharge Planning  [  ]

## 2020-04-06 NOTE — PROGRESS NOTE ADULT - ASSESSMENT
78yo Female with Known h/o CAD s/p PTCA, HTN, DM, temporal arteritis, OA.    Now with bilateral pneumonia sec to COVID 19.  Trop peaked at 6.4    ·	b/l Pneumonia 2/2 COVID 19  ·	NSTEMI  ·	CAD hx  ·	Essential HTN  ·	DM2    Plan  cardiac enzymes downtrending and ECG otherwise shows no new changes.   On Plaquenil 200 mg daily now, Increased QTc noted. monitor QTc progression  On ASA, Brilinta/IV heparin, statin, ACE-I, bbl   Will dc IV heparin   will add Norvasc home dose for BP control   Hemodynamically stable, otherwise no cardiac symptoms for now.  No indication for further interventions at present, will consider risk stratification after resolution of COVID infection. 78yo Female with Known h/o CAD s/p PTCA, HTN, DM, temporal arteritis, OA.    Now with bilateral pneumonia sec to COVID 19.  Trop peaked at 6.4    ·	b/l Pneumonia 2/2 COVID 19  ·	NSTEMI  ·	CAD hx  ·	Essential HTN  ·	DM2    Plan  cardiac enzymes downtrending   On Plaquenil 200 mg daily now, Increased QTc noted, monitor QTc progression  On ASA, Brilinta/IV heparin, statin, ACE-I, bbl   can dc IV heparin   will add Norvasc home dose for BP control   Hemodynamically stable, otherwise no cardiac symptoms for now.  No indication for further interventions at present, will consider risk stratification after resolution of COVID infection. 78yo Female with Known h/o CAD s/p PTCA, HTN, DM, temporal arteritis, OA.    Now with bilateral pneumonia sec to COVID 19.  Trop peaked at 6.4    ·	b/l Pneumonia 2/2 COVID 19  ·	NSTEMI  ·	CAD hx  ·	Essential HTN  ·	DM2    Plan  cardiac enzymes downtrending   On Plaquenil 200 mg daily now, Increased QTc noted, QTc today 463 monitor QTc progression  On ASA, Brilinta/IV heparin, statin, ACE-I, bbl   can dc IV heparin   will start Lovenox for DVT prophylaxis   will add Norvasc, home dose for BP control   Hemodynamically stable, otherwise no cardiac symptoms for now.  No indication for further interventions at present, will consider risk stratification after resolution of COVID infection.

## 2020-04-06 NOTE — PROGRESS NOTE ADULT - SUBJECTIVE AND OBJECTIVE BOX
Patient is a 79y old  Female who presents with a chief complaint of dyspnea (2020 13:09)    PAST MEDICAL & SURGICAL HISTORY:  Stented coronary artery  Arthritis  DM (diabetes mellitus)  CAD (coronary artery disease)  Hypertension  Temporal arteritis  H/O:   3-vessel CAD    INTERVAL HISTORY: Resting in bed  	  MEDICATIONS:  MEDICATIONS  (STANDING):  ascorbic acid 1000 milliGRAM(s) Oral two times a day  aspirin enteric coated 81 milliGRAM(s) Oral daily  atorvastatin 20 milliGRAM(s) Oral at bedtime  gabapentin 100 milliGRAM(s) Oral three times a day  heparin  Infusion.  Unit(s)/Hr (9 mL/Hr) IV Continuous <Continuous>  hydroxychloroquine   Oral   hydroxychloroquine 200 milliGRAM(s) Oral every 12 hours  insulin glargine Injectable (LANTUS) 20 Unit(s) SubCutaneous at bedtime  insulin lispro (HumaLOG) corrective regimen sliding scale   SubCutaneous three times a day before meals  insulin lispro (HumaLOG) corrective regimen sliding scale   SubCutaneous at bedtime  lisinopril 20 milliGRAM(s) Oral daily  methylPREDNISolone sodium succinate Injectable 40 milliGRAM(s) IV Push every 12 hours  metoprolol tartrate 25 milliGRAM(s) Oral two times a day  nystatin    Suspension 116890 Unit(s) Oral four times a day  senna 2 Tablet(s) Oral at bedtime  sodium chloride 0.9%. 1000 milliLiter(s) (50 mL/Hr) IV Continuous <Continuous>  ticagrelor 60 milliGRAM(s) Oral every 12 hours  zinc sulfate 220 milliGRAM(s) Oral daily    MEDICATIONS  (PRN):  acetaminophen   Tablet .. 650 milliGRAM(s) Oral every 4 hours PRN Temp greater or equal to 38.5C (101.3F)  bisacodyl Suppository 10 milliGRAM(s) Rectal daily PRN Constipation  dextrose 40% Gel 15 Gram(s) Oral once PRN Blood Glucose LESS THAN 70 milliGRAM(s)/deciliter  glucagon  Injectable 1 milliGRAM(s) IntraMuscular once PRN Glucose LESS THAN 70 milligrams/deciliter  heparin  Injectable 4400 Unit(s) IV Push every 6 hours PRN For aPTT less than 40  magnesium hydroxide Suspension 30 milliLiter(s) Oral daily PRN Constipation  nitroglycerin     SubLingual 0.4 milliGRAM(s) SubLingual every 5 minutes PRN Chest Pain    Vitals:  T(F): 100.5 (20 @ 08:35), Max: 100.5 (20 @ 08:35)  HR: 86 (20 @ 06:00) (73 - 86)  BP: 156/86 (20 @ 06:00) (129/57 - 185/73)  RR: 22 (20 @ 06:00) (22 - 22)  SpO2: 98% (20 @ 06:00) (97% - 99%)     @ 07:01  -   @ 07:00  --------------------------------------------------------  IN:    Oral Fluid: 520 mL  Total IN: 520 mL    OUT:  Total OUT: 0 mL    Total NET: 520 mL    PHYSICAL EXAM:  Neuro: Awake, responsive  CV: S1 S2 RRR  Lungs: CTABL  GI: Soft, BS +, ND, NT  Extremities: No edema    TELEMETRY:     RADIOLOGY: < from: Xray Chest 1 View AP/PA. (20 @ 00:23) >  Moderatepatchy bilateral airspace opacities, relatively sparing the left apex.  No pneumothorax or pleural effusion.   The cardiac silhouette is not accurately assessed by AP technique.    IMPRESSION:  Moderate patchy bilateral airspace opacities, relativelysparing the left apex. Findings may represent infection from atypical or typical agents, including viral pneumonia due to atypical agents (COVID-19 pneumonia).    < end of copied text >    DIAGNOSTIC TESTING:    [x ] Echocardiogram: < from: Transthoracic Echocardiogram (17 @ 06:39) >  1. Normal mitral valve. Mild mitral regurgitation.  2. Normal trileaflet aortic valve. Mild aortic  insufficiency.  3. Aortic Root: 2.7 cm.  4. Mild left atrial enlargement.  5. Normal left ventricular internal dimensions and wall  thicknesses.  6. Normal Left Ventricular Systolic Function,  (EF = 55 to  60%)  7. Grade II diastolic dysfunction.  8. Normal right atrium.  9. Normal right ventricular size and function.  10. Unable to estimate RVSP.  11. Normal tricuspid valve.  12. Normal pulmonic valve.  13. Trivial pericardial effusion is seen.    < end of copied text >    LABS:	 	    CARDIAC MARKERS:  Troponin I, Serum: 2.840 ng/mL ( @ 06:45)  Troponin I, Serum: 4.880 ng/mL ( @ 02:38)  Troponin I, Serum: 6.520 ng/mL ( @ 17:08)  Troponin I, Serum: 6.460 ng/mL ( @ 07:54)    2020 06:45    128    |  98     |  33     ----------------------------<  342    4.8     |  20     |  1.31   2020 07:54    131    |  98     |  20     ----------------------------<  257    4.3     |  19     |  1.19     Ca    8.4        2020 06:45    TPro  7.1    /  Alb  2.1    /  TBili  0.4    /  DBili  x      /  AST  72     /  ALT  41     /  AlkPhos  82     2020 06:45                          10.7   19.72 )-----------( 327      ( 2020 06:45 )             33.9 ,                       10.1   16.97 )-----------( 331      ( 2020 17:07 )             32.3 ,                       10.8   14.39 )-----------( 296      ( 2020 07:54 )             35.4     HgA1c: Hemoglobin A1C, Whole Blood: 7.8 % ( @ 11:35)    PT/PTT- ( 2020 07:38 )   PT: x    ;  PTT: 52.2 sec     PT/PTT- ( 2020 19:54 )   PT: x    ;  PTT: 36.9 sec     PT/PTT- ( 2020 11:52 )   PT: x    ;  PTT: 53.2 sec     INR: 1.27 ratio ( @ 10:00) Patient is a 79y old  Female who presents with a chief complaint of dyspnea (2020 13:09)    PAST MEDICAL & SURGICAL HISTORY:  Stented coronary artery  Arthritis  DM (diabetes mellitus)  CAD (coronary artery disease)  Hypertension  Temporal arteritis  H/O:   3-vessel CAD    INTERVAL HISTORY: Resting in bed, with mild sob and generalized weakness   	  MEDICATIONS:  MEDICATIONS  (STANDING):  ascorbic acid 1000 milliGRAM(s) Oral two times a day  aspirin enteric coated 81 milliGRAM(s) Oral daily  atorvastatin 20 milliGRAM(s) Oral at bedtime  gabapentin 100 milliGRAM(s) Oral three times a day  heparin  Infusion.  Unit(s)/Hr (9 mL/Hr) IV Continuous <Continuous>  hydroxychloroquine   Oral   hydroxychloroquine 200 milliGRAM(s) Oral every 12 hours  insulin glargine Injectable (LANTUS) 20 Unit(s) SubCutaneous at bedtime  insulin lispro (HumaLOG) corrective regimen sliding scale   SubCutaneous three times a day before meals  insulin lispro (HumaLOG) corrective regimen sliding scale   SubCutaneous at bedtime  lisinopril 20 milliGRAM(s) Oral daily  methylPREDNISolone sodium succinate Injectable 40 milliGRAM(s) IV Push every 12 hours  metoprolol tartrate 25 milliGRAM(s) Oral two times a day  nystatin    Suspension 176325 Unit(s) Oral four times a day  senna 2 Tablet(s) Oral at bedtime  sodium chloride 0.9%. 1000 milliLiter(s) (50 mL/Hr) IV Continuous <Continuous>  ticagrelor 60 milliGRAM(s) Oral every 12 hours  zinc sulfate 220 milliGRAM(s) Oral daily    MEDICATIONS  (PRN):  acetaminophen   Tablet .. 650 milliGRAM(s) Oral every 4 hours PRN Temp greater or equal to 38.5C (101.3F)  bisacodyl Suppository 10 milliGRAM(s) Rectal daily PRN Constipation  dextrose 40% Gel 15 Gram(s) Oral once PRN Blood Glucose LESS THAN 70 milliGRAM(s)/deciliter  glucagon  Injectable 1 milliGRAM(s) IntraMuscular once PRN Glucose LESS THAN 70 milligrams/deciliter  heparin  Injectable 4400 Unit(s) IV Push every 6 hours PRN For aPTT less than 40  magnesium hydroxide Suspension 30 milliLiter(s) Oral daily PRN Constipation  nitroglycerin     SubLingual 0.4 milliGRAM(s) SubLingual every 5 minutes PRN Chest Pain    Vitals:  T(F): 100.5 (20 @ 08:35), Max: 100.5 (20 @ 08:35)  HR: 86 (20 @ 06:00) (73 - 86)  BP: 156/86 (20 @ 06:00) (129/57 - 185/73)  RR: 22 (20 @ 06:00) (22 - 22)  SpO2: 98% (20 @ 06:00) (97% - 99%)     @ 07:01  -   @ 07:00  --------------------------------------------------------  IN:    Oral Fluid: 520 mL  Total IN: 520 mL    OUT:  Total OUT: 0 mL    Total NET: 520 mL    PHYSICAL EXAM:  Neuro: Awake, responsive  CV: S1 S2 RRR  Lungs: CTABL  GI: Soft, BS +, ND, NT  Extremities: No edema    TELEMETRY: RSR    RADIOLOGY: < from: Xray Chest 1 View AP/PA. (20 @ 00:23) >  Moderatepatchy bilateral airspace opacities, relatively sparing the left apex.  No pneumothorax or pleural effusion.   The cardiac silhouette is not accurately assessed by AP technique.    IMPRESSION:  Moderate patchy bilateral airspace opacities, relativelysparing the left apex. Findings may represent infection from atypical or typical agents, including viral pneumonia due to atypical agents (COVID-19 pneumonia).    < end of copied text >    DIAGNOSTIC TESTING:    [x ] Echocardiogram: < from: Transthoracic Echocardiogram (17 @ 06:39) >  1. Normal mitral valve. Mild mitral regurgitation.  2. Normal trileaflet aortic valve. Mild aortic  insufficiency.  3. Aortic Root: 2.7 cm.  4. Mild left atrial enlargement.  5. Normal left ventricular internal dimensions and wall  thicknesses.  6. Normal Left Ventricular Systolic Function,  (EF = 55 to  60%)  7. Grade II diastolic dysfunction.  8. Normal right atrium.  9. Normal right ventricular size and function.  10. Unable to estimate RVSP.  11. Normal tricuspid valve.  12. Normal pulmonic valve.  13. Trivial pericardial effusion is seen.    < end of copied text >    LABS:	 	    CARDIAC MARKERS:  Troponin I, Serum: 2.840 ng/mL ( @ 06:45)  Troponin I, Serum: 4.880 ng/mL ( @ 02:38)  Troponin I, Serum: 6.520 ng/mL ( @ 17:08)  Troponin I, Serum: 6.460 ng/mL ( @ 07:54)    2020 06:45    128    |  98     |  33     ----------------------------<  342    4.8     |  20     |  1.31   2020 07:54    131    |  98     |  20     ----------------------------<  257    4.3     |  19     |  1.19     Ca    8.4        2020 06:45    TPro  7.1    /  Alb  2.1    /  TBili  0.4    /  DBili  x      /  AST  72     /  ALT  41     /  AlkPhos  82     2020 06:45                          10.7   19.72 )-----------( 327      ( 2020 06:45 )             33.9 ,                       10.1   16.97 )-----------( 331      ( 2020 17:07 )             32.3 ,                       10.8   14.39 )-----------( 296      ( 2020 07:54 )             35.4     HgA1c: Hemoglobin A1C, Whole Blood: 7.8 % ( @ 11:35)    PT/PTT- ( 2020 07:38 )   PT: x    ;  PTT: 52.2 sec     PT/PTT- ( 2020 19:54 )   PT: x    ;  PTT: 36.9 sec     PT/PTT- ( 2020 11:52 )   PT: x    ;  PTT: 53.2 sec     INR: 1.27 ratio ( @ 10:00)

## 2020-04-07 NOTE — PROGRESS NOTE ADULT - ASSESSMENT
80yo Female with Known h/o CAD s/p PTCA, HTN, DM, temporal arteritis, OA.    Now with bilateral pneumonia sec to COVID 19.  Trop peaked at 6.4    ·	b/l Pneumonia 2/2 COVID 19  ·	NSTEMI  ·	CAD hx  ·	Essential HTN  ·	DM2    Plan  cardiac enzymes downtrending   On Plaquenil 200 mg daily now, Increased QTc noted, QTc today 463 monitor QTc progression  On ASA, Brilinta/IV heparin, statin, ACE-I, bbl   can dc IV heparin   will start Lovenox for DVT prophylaxis   will add Norvasc, home dose for BP control   Hemodynamically stable, otherwise no cardiac symptoms for now.  No indication for further interventions at present, will consider risk stratification after resolution of COVID infection. 78yo Female with Known h/o CAD s/p PTCA, HTN, DM, temporal arteritis, OA.    Now with bilateral pneumonia sec to COVID 19.  Trop peaked at 6.4    ·	b/l Pneumonia 2/2 COVID 19  ·	NSTEMI  ·	CAD hx  ·	Essential HTN  ·	DM2    Plan  cardiac enzymes downtrending   On Plaquenil dosing 200 mg Q12 now  Follow Covid markers  On ASA, Brilinta/IV heparin, statin, ACE-I, bbl   Cont Lovenox for DVT prophylaxis   can increase  Norvasc to 10 mg for better BP control  Hemodynamically stable, otherwise no cardiac symptoms for now.  Lipid panel  Maintain negative balance in setting of Covid infection    No indication for further interventions at present, will consider risk stratification after resolution of COVID infection.

## 2020-04-07 NOTE — PROGRESS NOTE ADULT - SUBJECTIVE AND OBJECTIVE BOX
Patient is a 79y old  Female who presents with a chief complaint of dyspnea (2020 09:47)    PAST MEDICAL & SURGICAL HISTORY:  Stented coronary artery  Arthritis  DM (diabetes mellitus)  CAD (coronary artery disease)  Hypertension  Temporal arteritis  H/O:   3-vessel CAD    INTERVAL HISTORY: Resting in bed  	  MEDICATIONS:  MEDICATIONS  (STANDING):  amLODIPine   Tablet 5 milliGRAM(s) Oral daily  ascorbic acid 1000 milliGRAM(s) Oral two times a day  aspirin enteric coated 81 milliGRAM(s) Oral daily  atorvastatin 20 milliGRAM(s) Oral at bedtime  cefTRIAXone   IVPB 1000 milliGRAM(s) IV Intermittent every 24 hours  enoxaparin Injectable 30 milliGRAM(s) SubCutaneous two times a day  gabapentin 100 milliGRAM(s) Oral three times a day  hydroxychloroquine   Oral   hydroxychloroquine 200 milliGRAM(s) Oral every 12 hours  insulin glargine Injectable (LANTUS) 30 Unit(s) SubCutaneous at bedtime  insulin lispro (HumaLOG) corrective regimen sliding scale   SubCutaneous three times a day before meals  insulin lispro (HumaLOG) corrective regimen sliding scale   SubCutaneous at bedtime  lisinopril 20 milliGRAM(s) Oral daily  metoprolol tartrate 25 milliGRAM(s) Oral two times a day  nystatin    Suspension 642403 Unit(s) Oral four times a day  senna 2 Tablet(s) Oral at bedtime  sodium chloride 0.9%. 1000 milliLiter(s) (50 mL/Hr) IV Continuous <Continuous>  ticagrelor 60 milliGRAM(s) Oral every 12 hours  zinc sulfate 220 milliGRAM(s) Oral daily    MEDICATIONS  (PRN):  acetaminophen   Tablet .. 650 milliGRAM(s) Oral every 4 hours PRN Temp greater or equal to 38.5C (101.3F)  bisacodyl Suppository 10 milliGRAM(s) Rectal daily PRN Constipation  dextrose 40% Gel 15 Gram(s) Oral once PRN Blood Glucose LESS THAN 70 milliGRAM(s)/deciliter  glucagon  Injectable 1 milliGRAM(s) IntraMuscular once PRN Glucose LESS THAN 70 milligrams/deciliter  magnesium hydroxide Suspension 30 milliLiter(s) Oral daily PRN Constipation  nitroglycerin     SubLingual 0.4 milliGRAM(s) SubLingual every 5 minutes PRN Chest Pain    Vitals:  T(F): 98 (20 @ 05:34), Max: 99.5 (20 @ 13:09)  HR: 84 (20 @ 05:34) (72 - 84)  BP: 159/66 (20 @ 05:34) (136/69 - 159/66)  RR: 22 (20 @ 05:34) (22 - 24)  SpO2: 95% (20 @ 05:34) (95% - 100%)    :01  -   07:00  --------------------------------------------------------  IN:    Oral Fluid: 100 mL  Total IN: 100 mL    OUT:    Indwelling Catheter - Urethral: 300 mL  Total OUT: 300 mL    Total NET: -200 mL    PHYSICAL EXAM:  Neuro: Awake, responsive  CV: S1 S2 RRR  Lungs: CTABL  GI: Soft, BS +, ND, NT  Extremities: No edema    TELEMETRY:     RADIOLOGY: < from: Xray Chest 1 View AP/PA. (20 @ 00:23) >  Moderate patchy bilateral airspace opacities, relativelysparing the left apex. Findings may represent infection from atypical or typical agents, including viral pneumonia due to atypical agents (COVID-19 pneumonia).    < end of copied text >    DIAGNOSTIC TESTING:    [x ] Echocardiogram: < from: Transthoracic Echocardiogram (. @ 06:39) >  1. Normal mitral valve. Mild mitral regurgitation.  2. Normal trileaflet aortic valve. Mild aortic  insufficiency.  3. Aortic Root: 2.7 cm.  4. Mild left atrial enlargement.  5. Normal left ventricular internal dimensions and wall  thicknesses.  6. Normal Left Ventricular Systolic Function,  (EF = 55 to  60%)  7. Grade II diastolic dysfunction.  8. Normal right atrium.  9. Normal right ventricular size and function.  10. Unable to estimate RVSP.  11. Normal tricuspid valve.  12. Normal pulmonic valve.  13. Trivial pericardial effusion is seen.    < end of copied text >    LABS:	 	    CARDIAC MARKERS:  Troponin I, Serum: 2.840 ng/mL ( @ 06:45)  Troponin I, Serum: 4.880 ng/mL ( @ 02:38)  Troponin I, Serum: 6.520 ng/mL ( @ 17:08)    2020 17:34    134    |  104    |  30     ----------------------------<  249    5.2     |  20     |  1.16   2020 06:45    128    |  98     |  33     ----------------------------<  342    4.8     |  20     |  1.31     Ca    8.7        2020 17:34    TPro  7.3    /  Alb  2.1    /  TBili  0.3    /  DBili  x      /  AST  71     /  ALT  48     /  AlkPhos  147    2020 17:34                          10.4   16.35 )-----------( 376      ( 2020 17:34 )             34.1 ,                       10.7   19.72 )-----------( 327      ( 2020 06:45 )             33.9 ,                       10.1   16.97 )-----------( 331      ( 2020 17:07 )             32.3   HgA1c: Hemoglobin A1C, Whole Blood: 7.8 % ( @ 11:35)    PT/PTT- ( 2020 17:13 )   PT: x    ;  PTT: 29.4 sec  INR: 1.27 ratio ( @ 10:00) Patient is a 79y old  Female who presents with a chief complaint of dyspnea (2020 09:47)    PAST MEDICAL & SURGICAL HISTORY:  Stented coronary artery  Arthritis  DM (diabetes mellitus)  CAD (coronary artery disease)  Hypertension  Temporal arteritis  H/O:   3-vessel CAD    INTERVAL HISTORY: Resting in bed, remains on 100% NRB  	  MEDICATIONS:  MEDICATIONS  (STANDING):  amLODIPine   Tablet 5 milliGRAM(s) Oral daily  ascorbic acid 1000 milliGRAM(s) Oral two times a day  aspirin enteric coated 81 milliGRAM(s) Oral daily  atorvastatin 20 milliGRAM(s) Oral at bedtime  cefTRIAXone   IVPB 1000 milliGRAM(s) IV Intermittent every 24 hours  enoxaparin Injectable 30 milliGRAM(s) SubCutaneous two times a day  gabapentin 100 milliGRAM(s) Oral three times a day  hydroxychloroquine   Oral   hydroxychloroquine 200 milliGRAM(s) Oral every 12 hours  insulin glargine Injectable (LANTUS) 30 Unit(s) SubCutaneous at bedtime  insulin lispro (HumaLOG) corrective regimen sliding scale   SubCutaneous three times a day before meals  insulin lispro (HumaLOG) corrective regimen sliding scale   SubCutaneous at bedtime  lisinopril 20 milliGRAM(s) Oral daily  metoprolol tartrate 25 milliGRAM(s) Oral two times a day  nystatin    Suspension 561076 Unit(s) Oral four times a day  senna 2 Tablet(s) Oral at bedtime  sodium chloride 0.9%. 1000 milliLiter(s) (50 mL/Hr) IV Continuous <Continuous>  ticagrelor 60 milliGRAM(s) Oral every 12 hours  zinc sulfate 220 milliGRAM(s) Oral daily    MEDICATIONS  (PRN):  acetaminophen   Tablet .. 650 milliGRAM(s) Oral every 4 hours PRN Temp greater or equal to 38.5C (101.3F)  bisacodyl Suppository 10 milliGRAM(s) Rectal daily PRN Constipation  dextrose 40% Gel 15 Gram(s) Oral once PRN Blood Glucose LESS THAN 70 milliGRAM(s)/deciliter  glucagon  Injectable 1 milliGRAM(s) IntraMuscular once PRN Glucose LESS THAN 70 milligrams/deciliter  magnesium hydroxide Suspension 30 milliLiter(s) Oral daily PRN Constipation  nitroglycerin     SubLingual 0.4 milliGRAM(s) SubLingual every 5 minutes PRN Chest Pain    Vitals:  T(F): 98 (20 @ 05:34), Max: 99.5 (20 @ 13:09)  HR: 84 (20 @ 05:34) (72 - 84)  BP: 159/66 (20 @ 05:34) (136/69 - 159/66)  RR: 22 (20 @ 05:34) (22 - 24)  SpO2: 95% (20 @ 05:34) (95% - 100%)     07:01  -   07:00  --------------------------------------------------------  IN:    Oral Fluid: 100 mL  Total IN: 100 mL    OUT:    Indwelling Catheter - Urethral: 300 mL  Total OUT: 300 mL    Total NET: -200 mL    PHYSICAL EXAM:  Neuro: Awake, responsive  CV: S1 S2 RRR  Lungs: few rales to bases   GI: Soft, BS +, ND, NT  Extremities: No edema    TELEMETRY: RSR    RADIOLOGY: < from: Xray Chest 1 View AP/PA. (20 @ 00:23) >  Moderate patchy bilateral airspace opacities, relativelysparing the left apex. Findings may represent infection from atypical or typical agents, including viral pneumonia due to atypical agents (COVID-19 pneumonia).    < end of copied text >    DIAGNOSTIC TESTING:    [x ] Echocardiogram: < from: Transthoracic Echocardiogram (17 @ 06:39) >  1. Normal mitral valve. Mild mitral regurgitation.  2. Normal trileaflet aortic valve. Mild aortic  insufficiency.  3. Aortic Root: 2.7 cm.  4. Mild left atrial enlargement.  5. Normal left ventricular internal dimensions and wall  thicknesses.  6. Normal Left Ventricular Systolic Function,  (EF = 55 to  60%)  7. Grade II diastolic dysfunction.  8. Normal right atrium.  9. Normal right ventricular size and function.  10. Unable to estimate RVSP.  11. Normal tricuspid valve.  12. Normal pulmonic valve.  13. Trivial pericardial effusion is seen.    < end of copied text >    LABS:	 	    CARDIAC MARKERS:  Troponin I, Serum: 2.840 ng/mL ( @ 06:45)  Troponin I, Serum: 4.880 ng/mL ( @ 02:38)  Troponin I, Serum: 6.520 ng/mL ( @ 17:08)    2020 17:34    134    |  104    |  30     ----------------------------<  249    5.2     |  20     |  1.16   2020 06:45    128    |  98     |  33     ----------------------------<  342    4.8     |  20     |  1.31     Ca    8.7        2020 17:34    TPro  7.3    /  Alb  2.1    /  TBili  0.3    /  DBili  x      /  AST  71     /  ALT  48     /  AlkPhos  147    2020 17:34                          10.4   16.35 )-----------( 376      ( 2020 17:34 )             34.1 ,                       10.7   19.72 )-----------( 327      ( 2020 06:45 )             33.9 ,                       10.1   16.97 )-----------( 331      ( 2020 17:07 )             32.3   HgA1c: Hemoglobin A1C, Whole Blood: 7.8 % ( @ 11:35)    PT/PTT- ( 2020 17:13 )   PT: x    ;  PTT: 29.4 sec  INR: 1.27 ratio ( @ 10:00)

## 2020-04-07 NOTE — PROGRESS NOTE ADULT - SUBJECTIVE AND OBJECTIVE BOX
Patient is a 79y old  Female who presents with a chief complaint of dyspnea (2020 12:13)  still moderately dyspnoeic. .   sat 95% on non rebreather.   had 2 BMs yesterday. still no appettite.      wbc elevated- starting on IV Rocephin empirically.      INTERVAL HPI/OVERNIGHT EVENTS:  PAST MEDICAL & SURGICAL HISTORY:  Stented coronary artery  Arthritis  DM (diabetes mellitus)  CAD (coronary artery disease)  Hypertension  Temporal arteritis  H/O:   3-vessel CAD      MEDICATIONS  (STANDING):  amLODIPine   Tablet 5 milliGRAM(s) Oral daily  ascorbic acid 1000 milliGRAM(s) Oral two times a day  aspirin enteric coated 81 milliGRAM(s) Oral daily  atorvastatin 20 milliGRAM(s) Oral at bedtime  cefTRIAXone   IVPB 1000 milliGRAM(s) IV Intermittent every 24 hours  dextrose 5%. 1000 milliLiter(s) (50 mL/Hr) IV Continuous <Continuous>  dextrose 50% Injectable 12.5 Gram(s) IV Push once  dextrose 50% Injectable 25 Gram(s) IV Push once  dextrose 50% Injectable 25 Gram(s) IV Push once  enoxaparin Injectable 30 milliGRAM(s) SubCutaneous two times a day  gabapentin 100 milliGRAM(s) Oral three times a day  hydroxychloroquine   Oral   hydroxychloroquine 200 milliGRAM(s) Oral every 12 hours  insulin glargine Injectable (LANTUS) 20 Unit(s) SubCutaneous at bedtime  insulin lispro (HumaLOG) corrective regimen sliding scale   SubCutaneous three times a day before meals  insulin lispro (HumaLOG) corrective regimen sliding scale   SubCutaneous at bedtime  lisinopril 20 milliGRAM(s) Oral daily  metoprolol tartrate 25 milliGRAM(s) Oral two times a day  nystatin    Suspension 513732 Unit(s) Oral four times a day  senna 2 Tablet(s) Oral at bedtime  sodium chloride 0.9%. 1000 milliLiter(s) (50 mL/Hr) IV Continuous <Continuous>  ticagrelor 60 milliGRAM(s) Oral every 12 hours  zinc sulfate 220 milliGRAM(s) Oral daily    MEDICATIONS  (PRN):  acetaminophen   Tablet .. 650 milliGRAM(s) Oral every 4 hours PRN Temp greater or equal to 38.5C (101.3F)  bisacodyl Suppository 10 milliGRAM(s) Rectal daily PRN Constipation  dextrose 40% Gel 15 Gram(s) Oral once PRN Blood Glucose LESS THAN 70 milliGRAM(s)/deciliter  glucagon  Injectable 1 milliGRAM(s) IntraMuscular once PRN Glucose LESS THAN 70 milligrams/deciliter  magnesium hydroxide Suspension 30 milliLiter(s) Oral daily PRN Constipation  nitroglycerin     SubLingual 0.4 milliGRAM(s) SubLingual every 5 minutes PRN Chest Pain      Allergies    codeine (Nausea)    Intolerances        REVIEW OF SYSTEMS:  CONSTITUTIONAL: No fever, weight loss, or fatigue  EYES: No eye pain, visual disturbances, or discharge  ENMT:  No difficulty hearing, tinnitus, vertigo; No sinus or throat pain  NECK: No pain or stiffness  BREASTS: No pain, masses, or nipple discharge  RESPIRATORY: No cough, wheezing, chills or hemoptysis; No shortness of breath  CARDIOVASCULAR: No chest pain, palpitations, dizziness, or leg swelling  GASTROINTESTINAL: No abdominal or epigastric pain. No nausea, vomiting, or hematemesis; No diarrhea or constipation. No melena or hematochezia.  GENITOURINARY: No dysuria, frequency, hematuria, or incontinence  NEUROLOGICAL: No headaches, memory loss, loss of strength, numbness, or tremors  SKIN: No itching, burning, rashes, or lesions   LYMPH NODES: No enlarged glands  ENDOCRINE: No heat or cold intolerance; No hair loss  MUSCULOSKELETAL: No joint pain or swelling; No muscle, back, or extremity pain  PSYCHIATRIC: No depression, anxiety, mood swings, or difficulty sleeping  HEME/LYMPH: No easy bruising, or bleeding gums  ALLERY AND IMMUNOLOGIC: No hives or eczema    Vital Signs Last 24 Hrs  T(C): 36.7 (2020 05:34), Max: 37.5 (2020 13:09)  T(F): 98 (2020 05:34), Max: 99.5 (2020 13:09)  HR: 84 (2020 05:34) (72 - 84)  BP: 159/66 (2020 05:34) (136/69 - 159/66)  BP(mean): --  RR: 22 (2020 05:34) (22 - 24)  SpO2: 95% (2020 05:34) (95% - 100%)    PHYSICAL EXAM:  GENERAL: NAD, well-groomed, well-developed  HEAD:  Atraumatic, Normocephalic  EYES: EOMI, PERRLA, conjunctiva and sclera clear  ENMT: No tonsillar erythema, exudates, or enlargement; Moist mucous membranes, Good dentition, No lesions  NECK: Supple, No JVD, Normal thyroid  NERVOUS SYSTEM:  Alert & Oriented X3, Good concentration; Motor Strength 5/5 B/L upper and lower extremities; DTRs 2+ intact and symmetric  CHEST/LUNG: Clear to percussion bilaterally; No rales, rhonchi, wheezing, or rubs  HEART: Regular rate and rhythm; No murmurs, rubs, or gallops  ABDOMEN: Soft, Nontender, Nondistended; Bowel sounds present  EXTREMITIES:  2+ Peripheral Pulses, No clubbing, cyanosis, or edema  LYMPH: No lymphadenopathy noted  SKIN: No rashes or lesions    LABS:                        10.4   16.35 )-----------( 376      ( 2020 17:34 )             34.1         134<L>  |  104  |  30<H>  ----------------------------<  249<H>  5.2   |  20<L>  |  1.16    Ca    8.7      2020 17:34    TPro  7.3  /  Alb  2.1<L>  /  TBili  0.3  /  DBili  x   /  AST  71<H>  /  ALT  48  /  AlkPhos  147<H>        PTT - ( 2020 17:13 )  PTT:29.4 sec    CAPILLARY BLOOD GLUCOSE      POCT Blood Glucose.: 330 mg/dL (2020 09:12)  POCT Blood Glucose.: 288 mg/dL (2020 21:57)  POCT Blood Glucose.: 241 mg/dL (2020 15:57)  POCT Blood Glucose.: 340 mg/dL (2020 11:21)  POCT Blood Glucose.: 402 mg/dL (2020 09:51)          Hemoglobin A1C, Whole Blood: 7.8 % ( @ 11:35)        RADIOLOGY & ADDITIONAL TESTS:    Imaging Personally Reviewed:  [ ] YES  [ ] NO    Consultant(s) Notes Reviewed:  [ ] YES  [ ] NO    Care Discussed with Consultants/Other Providers [ ] YES  [ ] NO    Care discussed with family,         [  ]   yes  [  ]  No    imp:    stable[ ]    unstable[  ]     improving [   ]       unchanged  [x  ]                Plans:  Continue present plans  [x  ] astarting empiric Rocephin IV for elevated WBC.               New consult [  ]   specialty  .......               order test[  ]    test name.                  Discharge Planning  [  ]

## 2020-04-08 NOTE — PROGRESS NOTE ADULT - ASSESSMENT
80yo Female with Known h/o CAD s/p PTCA, HTN, DM, temporal arteritis, OA.    Now with bilateral pneumonia sec to COVID 19.  Trop peaked at 6.4    ·	b/l Pneumonia 2/2 COVID 19  ·	NSTEMI  ·	CAD hx  ·	Essential HTN  ·	DM2    Plan  cardiac enzymes downtrending   s/p Plaquenil dosing   Follow Covid markers  On ASA, Brilinta/IV heparin, statin, ACE-I, bbl   Cont Lovenox for DVT prophylaxis   can increase  Norvasc to 10 mg for better BP control  Hemodynamically stable, otherwise no cardiac symptoms for now.  Lipid panel  Maintain negative balance in setting of Covid infection    No indication for further interventions at present, will consider risk stratification after resolution of COVID infection. 80yo Female with Known h/o CAD s/p PTCA, HTN, DM, temporal arteritis, OA.    Now with bilateral pneumonia sec to COVID 19.  Trop peaked at 6.4    ·	b/l Pneumonia 2/2 COVID 19  ·	NSTEMI  ·	CAD hx  ·	Essential HTN  ·	DM2  ·	Leukocytosis     Plan  Cont 100% NRB, cont Pulse oxymetry   s/p Plaquenil, solumedrol dosing for Covid management    Follow Covid markers  Now on Rocephin, WBC 20.46,  Daily prednisone for temporal arteritis Hx  On ASA, Brilinta/ statin, ACE-I, bbl   Cont Lovenox for DVT prophylaxis   Will increase  Norvasc to 10 mg for better BP control  Hemodynamically stable, otherwise no cardiac symptoms for now.  Maintain negative balance in setting of Covid infection    No indication for further interventions at present, will consider risk stratification after resolution of COVID infection.

## 2020-04-08 NOTE — PROGRESS NOTE ADULT - SUBJECTIVE AND OBJECTIVE BOX
Patient is a 79y old  Female who presents with a chief complaint of dyspnea (2020 09:24)    PAST MEDICAL & SURGICAL HISTORY:  Stented coronary artery  Arthritis  DM (diabetes mellitus)  CAD (coronary artery disease)  Hypertension  Temporal arteritis  H/O:   3-vessel CAD    INTERVAL HISTORY: Resting in bed  	  MEDICATIONS:  MEDICATIONS  (STANDING):  amLODIPine   Tablet 5 milliGRAM(s) Oral daily  ascorbic acid 1000 milliGRAM(s) Oral two times a day  aspirin enteric coated 81 milliGRAM(s) Oral daily  atorvastatin 20 milliGRAM(s) Oral at bedtime  cefTRIAXone   IVPB 1000 milliGRAM(s) IV Intermittent every 24 hours  enoxaparin Injectable 30 milliGRAM(s) SubCutaneous two times a day  gabapentin 100 milliGRAM(s) Oral three times a day  insulin glargine Injectable (LANTUS) 30 Unit(s) SubCutaneous at bedtime  insulin lispro (HumaLOG) corrective regimen sliding scale   SubCutaneous three times a day before meals  insulin lispro (HumaLOG) corrective regimen sliding scale   SubCutaneous at bedtime  lisinopril 20 milliGRAM(s) Oral daily  metoprolol tartrate 25 milliGRAM(s) Oral two times a day  nystatin    Suspension [id number] Unit(s) Oral four times a day  potassium phosphate IVPB 15 milliMole(s) IV Intermittent once  predniSONE   Tablet 10 milliGRAM(s) Oral daily  senna 2 Tablet(s) Oral at bedtime  sodium chloride 0.9%. 1000 milliLiter(s) (50 mL/Hr) IV Continuous <Continuous>  ticagrelor 60 milliGRAM(s) Oral every 12 hours  zinc sulfate 220 milliGRAM(s) Oral daily    MEDICATIONS  (PRN):  acetaminophen   Tablet .. 650 milliGRAM(s) Oral every 4 hours PRN Temp greater or equal to 38.5C (101.3F)  bisacodyl Suppository 10 milliGRAM(s) Rectal daily PRN Constipation  dextrose 40% Gel 15 Gram(s) Oral once PRN Blood Glucose LESS THAN 70 milliGRAM(s)/deciliter  glucagon  Injectable 1 milliGRAM(s) IntraMuscular once PRN Glucose LESS THAN 70 milligrams/deciliter  magnesium hydroxide Suspension 30 milliLiter(s) Oral daily PRN Constipation  nitroglycerin     SubLingual 0.4 milliGRAM(s) SubLingual every 5 minutes PRN Chest Pain    Vitals:  T(F): 98.1 (20 @ 05:38), Max: 98.5 (20 @ 11:23)  HR: 92 (20 @ 05:38) (79 - 92)  BP: 160/59 (20 @ 05:38) (140/58 - 183/78)  RR: 20 (20 @ 05:38) (19 - 21)  SpO2: 99% (20 @ 05:38) (99% - 100%)     07:01  -   07:00  --------------------------------------------------------  IN:    sodium chloride 0.9%.: 500 mL  Total IN: 500 mL    OUT:  Total OUT: 0 mL    Total NET: 500 mL    PHYSICAL EXAM:  Neuro: Awake, responsive  CV: S1 S2 RRR  Lungs: few rales to bases   GI: Soft, BS +, ND, NT  Extremities: No edema    TELEMETRY: RSR    RADIOLOGY:  < from: Xray Chest 1 View AP/PA. (20 @ 00:23) >  Moderatepatchy bilateral airspace opacities, relatively sparing the left apex.  No pneumothorax or pleural effusion.   The cardiac silhouette is not accurately assessed by AP technique.    IMPRESSION:  Moderate patchy bilateral airspace opacities, relativelysparing the left apex. Findings may represent infection from atypical or typical agents, including viral pneumonia due to atypical agents (COVID-19 pneumonia).    < end of copied text >      DIAGNOSTIC TESTING:    [x ] Echocardiogram: < from: Transthoracic Echocardiogram (17 @ 06:39) >  1. Normal mitral valve. Mild mitral regurgitation.  2. Normal trileaflet aortic valve. Mild aortic  insufficiency.  3. Aortic Root: 2.7 cm.  4. Mild left atrial enlargement.  5. Normal left ventricular internal dimensions and wall  thicknesses.  6. Normal Left Ventricular Systolic Function,  (EF = 55 to  60%)  7. Grade II diastolic dysfunction.  8. Normal right atrium.  9. Normal right ventricular size and function.  10. Unable to estimate RVSP.  11. Normal tricuspid valve.  12. Normal pulmonic valve.  13. Trivial pericardial effusion is seen.    < end of copied text >      LABS:	 	    2020 06:55    142    |  111    |  27     ----------------------------<  232    4.5     |  24     |  0.99   2020 19:01    139    |  109    |  28     ----------------------------<  244    4.8     |  24     |  0.98   2020 17:34    134    |  104    |  30     ----------------------------<  249    5.2     |  20     |  1.16     Ca    9.1        2020 06:55  Phos  1.8       2020 06:55  Mg     2.6       2020 06:55    TPro  7.3    /  Alb  2.1    /  TBili  0.3    /  DBili  x      /  AST  71     /  ALT  48     /  AlkPhos  147    2020 17:34                          10.4   20.46 )-----------( 350      ( 2020 06:55 )             34.4 ,                       9.8    18.26 )-----------( 340      ( 2020 19:01 )             32.2 ,                       10.4   16.35 )-----------( 376      ( 2020 17:34 )             34.1   proBNP:   Lipid Profile:  @ 08:49  HDL/Total Cholesterol: --  HDL Chol:              21 mg/dL  Serum Chol:            142 mg/dL  Direct LDL:            65 mg/dL  Triglycerides:         286 mg/dL    HgA1c: Hemoglobin A1C, Whole Blood: 7.8 % ( @ 11:35)    TSH: Patient is a 79y old  Female who presents with a chief complaint of dyspnea (2020 09:24)    PAST MEDICAL & SURGICAL HISTORY:  Stented coronary artery  Arthritis  DM (diabetes mellitus)  CAD (coronary artery disease)  Hypertension  Temporal arteritis  H/O:   3-vessel CAD    INTERVAL HISTORY: Resting in bed, feeling weak, still on 100% NRB   	  MEDICATIONS:  MEDICATIONS  (STANDING):  amLODIPine   Tablet 5 milliGRAM(s) Oral daily  ascorbic acid 1000 milliGRAM(s) Oral two times a day  aspirin enteric coated 81 milliGRAM(s) Oral daily  atorvastatin 20 milliGRAM(s) Oral at bedtime  cefTRIAXone   IVPB 1000 milliGRAM(s) IV Intermittent every 24 hours  enoxaparin Injectable 30 milliGRAM(s) SubCutaneous two times a day  gabapentin 100 milliGRAM(s) Oral three times a day  insulin glargine Injectable (LANTUS) 30 Unit(s) SubCutaneous at bedtime  insulin lispro (HumaLOG) corrective regimen sliding scale   SubCutaneous three times a day before meals  insulin lispro (HumaLOG) corrective regimen sliding scale   SubCutaneous at bedtime  lisinopril 20 milliGRAM(s) Oral daily  metoprolol tartrate 25 milliGRAM(s) Oral two times a day  nystatin    Suspension 317294 Unit(s) Oral four times a day  potassium phosphate IVPB 15 milliMole(s) IV Intermittent once  predniSONE   Tablet 10 milliGRAM(s) Oral daily  senna 2 Tablet(s) Oral at bedtime  sodium chloride 0.9%. 1000 milliLiter(s) (50 mL/Hr) IV Continuous <Continuous>  ticagrelor 60 milliGRAM(s) Oral every 12 hours  zinc sulfate 220 milliGRAM(s) Oral daily    MEDICATIONS  (PRN):  acetaminophen   Tablet .. 650 milliGRAM(s) Oral every 4 hours PRN Temp greater or equal to 38.5C (101.3F)  bisacodyl Suppository 10 milliGRAM(s) Rectal daily PRN Constipation  dextrose 40% Gel 15 Gram(s) Oral once PRN Blood Glucose LESS THAN 70 milliGRAM(s)/deciliter  glucagon  Injectable 1 milliGRAM(s) IntraMuscular once PRN Glucose LESS THAN 70 milligrams/deciliter  magnesium hydroxide Suspension 30 milliLiter(s) Oral daily PRN Constipation  nitroglycerin     SubLingual 0.4 milliGRAM(s) SubLingual every 5 minutes PRN Chest Pain    Vitals:  T(F): 98.1 (20 @ 05:38), Max: 98.5 (20 @ 11:23)  HR: 92 (20 @ 05:38) (79 - 92)  BP: 160/59 (20 @ 05:38) (140/58 - 183/78)  RR: 20 (20 @ 05:38) (19 - 21)  SpO2: 99% (20 @ 05:38) (99% - 100%)    :01  -   07:00  --------------------------------------------------------  IN:    sodium chloride 0.9%.: 500 mL  Total IN: 500 mL    OUT:  Total OUT: 0 mL    Total NET: 500 mL    PHYSICAL EXAM:  Neuro: Awake, responsive  CV: S1 S2 RRR  Lungs: few rales to bases   GI: Soft, BS +, ND, NT  Extremities: No edema    TELEMETRY: RSR    RADIOLOGY:  < from: Xray Chest 1 View AP/PA. (20 @ 00:23) >  Moderatepatchy bilateral airspace opacities, relatively sparing the left apex.  No pneumothorax or pleural effusion.   The cardiac silhouette is not accurately assessed by AP technique.    IMPRESSION:  Moderate patchy bilateral airspace opacities, relativelysparing the left apex. Findings may represent infection from atypical or typical agents, including viral pneumonia due to atypical agents (COVID-19 pneumonia).    < end of copied text >    DIAGNOSTIC TESTING:    [x ] Echocardiogram: < from: Transthoracic Echocardiogram (17 @ 06:39) >  1. Normal mitral valve. Mild mitral regurgitation.  2. Normal trileaflet aortic valve. Mild aortic  insufficiency.  3. Aortic Root: 2.7 cm.  4. Mild left atrial enlargement.  5. Normal left ventricular internal dimensions and wall  thicknesses.  6. Normal Left Ventricular Systolic Function,  (EF = 55 to  60%)  7. Grade II diastolic dysfunction.  8. Normal right atrium.  9. Normal right ventricular size and function.  10. Unable to estimate RVSP.  11. Normal tricuspid valve.  12. Normal pulmonic valve.  13. Trivial pericardial effusion is seen.    < end of copied text >    LABS:	 	    2020 06:55    142    |  111    |  27     ----------------------------<  232    4.5     |  24     |  0.99   2020 19:01    139    |  109    |  28     ----------------------------<  244    4.8     |  24     |  0.98   2020 17:34    134    |  104    |  30     ----------------------------<  249    5.2     |  20     |  1.16     Ca    9.1        2020 06:55  Phos  1.8       2020 06:55  Mg     2.6       2020 06:55    TPro  7.3    /  Alb  2.1    /  TBili  0.3    /  DBili  x      /  AST  71     /  ALT  48     /  AlkPhos  147    2020 17:34                          10.4   20.46 )-----------( 350      ( 2020 06:55 )             34.4 ,                       9.8    18.26 )-----------( 340      ( 2020 19:01 )             32.2 ,                       10.4   16.35 )-----------( 376      ( 2020 17:34 )             34.1   Lipid Profile:  @ 08:49  HDL/Total Cholesterol: --  HDL Chol:              21 mg/dL  Serum Chol:            142 mg/dL  Direct LDL:            65 mg/dL  Triglycerides:         286 mg/dL    HgA1c: Hemoglobin A1C, Whole Blood: 7.8 % ( @ 11:35)

## 2020-04-08 NOTE — PROGRESS NOTE ADULT - ATTENDING COMMENTS
BP;s still running high, consider titration of Lisinopril and/or addition of HCTZ for better control if additional AMlodipine insufficient.

## 2020-04-08 NOTE — PROGRESS NOTE ADULT - SUBJECTIVE AND OBJECTIVE BOX
Patient is a 79y old  Female who presents with a chief complaint of dyspnea (2020 09:52)    patient os saturating well on non rebreather, will try on nasal cannulla   wbc is elevated. - will repeat chest x-Ray.    Hypophosphatemia - will supplement phophate   recheck labs in am   [id number] with son   good urine output   diabetes control better.  INTERVAL HPI/OVERNIGHT EVENTS:  PAST MEDICAL & SURGICAL HISTORY:  Stented coronary artery  Arthritis  DM (diabetes mellitus)  CAD (coronary artery disease)  Hypertension  Temporal arteritis  H/O:   3-vessel CAD      MEDICATIONS  (STANDING):  amLODIPine   Tablet 5 milliGRAM(s) Oral daily  ascorbic acid 1000 milliGRAM(s) Oral two times a day  aspirin enteric coated 81 milliGRAM(s) Oral daily  atorvastatin 20 milliGRAM(s) Oral at bedtime  cefTRIAXone   IVPB 1000 milliGRAM(s) IV Intermittent every 24 hours  dextrose 5%. 1000 milliLiter(s) (50 mL/Hr) IV Continuous <Continuous>  dextrose 50% Injectable 12.5 Gram(s) IV Push once  dextrose 50% Injectable 25 Gram(s) IV Push once  dextrose 50% Injectable 25 Gram(s) IV Push once  enoxaparin Injectable 30 milliGRAM(s) SubCutaneous two times a day  gabapentin 100 milliGRAM(s) Oral three times a day  insulin glargine Injectable (LANTUS) 30 Unit(s) SubCutaneous at bedtime  insulin lispro (HumaLOG) corrective regimen sliding scale   SubCutaneous three times a day before meals  insulin lispro (HumaLOG) corrective regimen sliding scale   SubCutaneous at bedtime  lisinopril 20 milliGRAM(s) Oral daily  metoprolol tartrate 25 milliGRAM(s) Oral two times a day  nystatin    Suspension 160345 Unit(s) Oral four times a day  potassium phosphate IVPB 15 milliMole(s) IV Intermittent once  predniSONE   Tablet 10 milliGRAM(s) Oral daily  senna 2 Tablet(s) Oral at bedtime  sodium chloride 0.9%. 1000 milliLiter(s) (50 mL/Hr) IV Continuous <Continuous>  ticagrelor 60 milliGRAM(s) Oral every 12 hours  zinc sulfate 220 milliGRAM(s) Oral daily    MEDICATIONS  (PRN):  acetaminophen   Tablet .. 650 milliGRAM(s) Oral every 4 hours PRN Temp greater or equal to 38.5C (101.3F)  bisacodyl Suppository 10 milliGRAM(s) Rectal daily PRN Constipation  dextrose 40% Gel 15 Gram(s) Oral once PRN Blood Glucose LESS THAN 70 milliGRAM(s)/deciliter  glucagon  Injectable 1 milliGRAM(s) IntraMuscular once PRN Glucose LESS THAN 70 milligrams/deciliter  magnesium hydroxide Suspension 30 milliLiter(s) Oral daily PRN Constipation  nitroglycerin     SubLingual 0.4 milliGRAM(s) SubLingual every 5 minutes PRN Chest Pain      Allergies    codeine (Nausea)    Intolerances        REVIEW OF SYSTEMS:  CONSTITUTIONAL: No fever, weight loss, or fatigue  EYES: No eye pain, visual disturbances, or discharge  ENMT:  No difficulty hearing, tinnitus, vertigo; No sinus or throat pain  NECK: No pain or stiffness  BREASTS: No pain, masses, or nipple discharge  RESPIRATORY: No cough, wheezing, chills or hemoptysis; No shortness of breath  CARDIOVASCULAR: No chest pain, palpitations, dizziness, or leg swelling  GASTROINTESTINAL: No abdominal or epigastric pain. No nausea, vomiting, or hematemesis; No diarrhea or constipation. No melena or hematochezia.  GENITOURINARY: No dysuria, frequency, hematuria, or incontinence  NEUROLOGICAL: No headaches, memory loss, loss of strength, numbness, or tremors  SKIN: No itching, burning, rashes, or lesions   LYMPH NODES: No enlarged glands  ENDOCRINE: No heat or cold intolerance; No hair loss  MUSCULOSKELETAL: No joint pain or swelling; No muscle, back, or extremity pain  PSYCHIATRIC: No depression, anxiety, mood swings, or difficulty sleeping  HEME/LYMPH: No easy bruising, or bleeding gums  ALLERY AND IMMUNOLOGIC: No hives or eczema    Vital Signs Last 24 Hrs  T(C): 36.7 (2020 05:38), Max: 36.9 (2020 11:23)  T(F): 98.1 (2020 05:38), Max: 98.5 (2020 11:23)  HR: 92 (2020 05:38) (79 - 92)  BP: 160/59 (2020 05:38) (140/58 - 183/78)  BP(mean): --  RR: 20 (2020 05:38) (19 - 21)  SpO2: 99% (2020 05:38) (99% - 100%)    PHYSICAL EXAM:  GENERAL: NAD, well-groomed, well-developed. no acute distress.  HEAD:  Atraumatic, Normocephalic  EYES: EOMI, PERRLA, conjunctiva and sclera clear  ENMT: No tonsillar erythema, exudates, or enlargement; Moist mucous membranes, Good dentition, No lesions  NECK: Supple, No JVD, Normal thyroid  NERVOUS SYSTEM:  Alert & Oriented X3, Good concentration; Motor Strength 5/5 B/L upper and lower extremities; DTRs 2+ intact and symmetric  CHEST/LUNG: Clear to percussion bilaterally; No rales, rhonchi, wheezing, or rubs  HEART: Regular rate and rhythm; No murmurs, rubs, or gallops  ABDOMEN: Soft, Nontender, Nondistended; Bowel sounds present  EXTREMITIES:  2+ Peripheral Pulses, No clubbing, cyanosis, or edema  LYMPH: No lymphadenopathy noted  SKIN: No rashes or lesions    LABS:                        10.4   20.46 )-----------( 350      ( 2020 06:55 )             34.4         142  |  111<H>  |  27<H>  ----------------------------<  232<H>  4.5   |  24  |  0.99    Ca    9.1      2020 06:55  Phos  1.8       Mg     2.6         TPro  7.3  /  Alb  2.1<L>  /  TBili  0.3  /  DBili  x   /  AST  71<H>  /  ALT  48  /  AlkPhos  147<H>        PTT - ( 2020 17:13 )  PTT:29.4 sec    CAPILLARY BLOOD GLUCOSE      POCT Blood Glucose.: 240 mg/dL (2020 08:24)  POCT Blood Glucose.: 224 mg/dL (2020 21:50)  POCT Blood Glucose.: 305 mg/dL (2020 15:26)  POCT Blood Glucose.: 345 mg/dL (2020 13:17)          Hemoglobin A1C, Whole Blood: 7.8 % ( @ 11:35)    Cholesterol, Serum: 142 mg/dL ( @ 08:49)  HDL Cholesterol, Serum: 21 mg/dL ( @ 08:49)  Triglycerides, Serum: 286 mg/dL ( @ 08:49)      RADIOLOGY & ADDITIONAL TESTS:    Imaging Personally Reviewed:  [ ] YES  [ ] NO    Consultant(s) Notes Reviewed:  [ ] YES  [ ] NO    Care Discussed with Consultants/Other Providers [ ] YES  [ ] NO    Care discussed with family,         [  ]   yes  [  ]  No    imp:    stable[ ]    unstable[  ]     improving [ x  ]       unchanged  [  ]                Plans:  Continue present plans  [x  ] replete Phosphate               New consult [  ]   specialty  .......               order test[  ]    test name.   chest X-ray,. labs in am . D-Dimer.                Discharge Planning  [  ]

## 2020-04-09 NOTE — PROGRESS NOTE ADULT - ASSESSMENT
78yo Female with Known h/o CAD s/p PTCA, HTN, DM, temporal arteritis, OA.    Now with bilateral pneumonia sec to COVID 19.  Trop peaked at 6.4    ·	b/l Pneumonia 2/2 COVID 19  ·	NSTEMI  ·	CAD hx  ·	Essential HTN  ·	DM2  ·	Leukocytosis     Plan  Cont 100% NRB, cont Pulse oxymetry   s/p Plaquenil/solumedrol dosing for Covid management    Follow Covid markers  Now on Rocephin, WBC 20.46 -14.5  Daily prednisone for temporal arteritis Hx  On ASA, Brilinta/ statin, ACE-I, bbl   Cont Lovenox for DVT prophylaxis   Will increase  Norvasc to 10 mg for better BP control  Hemodynamically stable, otherwise no cardiac symptoms for now.  Maintain negative balance in setting of Covid infection    No indication for further interventions at present, will consider risk stratification after resolution of COVID infection. 78yo Female with Known h/o CAD s/p PTCA, HTN, DM, temporal arteritis, OA.    Now with bilateral pneumonia sec to COVID 19.  Trop peaked at 6.4    ·	b/l Pneumonia 2/2 COVID 19  ·	NSTEMI  ·	CAD hx  ·	Essential HTN  ·	DM2  ·	Leukocytosis     Plan  Cont 100% NRB, cont Pulse oxymetry   s/p Plaquenil/solumedrol dosing for Covid management    Follow Covid markers  Now on Rocephin, WBC 20.46 ->14.5  Daily prednisone for temporal arteritis Hx  On ASA, Brilinta/ statin, ACE-I, bbl   Cont Lovenox for DVT prophylaxis   Cont Norvasc for BP control   Maintain negative balance in setting of Covid infection    No indication for further cardiac interventions besides medical management  at present, will consider risk stratification after resolution of COVID infection. 78yo Female with Known h/o CAD s/p PTCA, HTN, DM, temporal arteritis, OA.    Now with bilateral pneumonia sec to COVID 19.  Trop peaked at 6.4  Tachycardia 140s with a temp of 102.9 (R), missed the dose of metoprolol this am     ·	b/l Pneumonia 2/2 COVID 19  ·	NSTEMI  ·	CAD hx  ·	Essential HTN  ·	DM2  ·	Leukocytosis     Plan  Cont 100% NRB, cont Pulse oxymetry   s/p Plaquenil/solumedrol dosing for Covid management, ? add Anakinra/ID consult   Follow Covid markers  Now on Rocephin, WBC 20.46 ->14.5  Daily prednisone for temporal arteritis Hx  On ASA, Brilinta/ statin, ACE-I, bbl   Cont Lovenox for DVT prophylaxis   Cont Norvasc for BP control   Maintain negative balance in setting of Covid infection    No indication for further cardiac interventions besides medical management  at present, will consider risk stratification after resolution of COVID infection.  Pt did not have her meds this morning, concern with ability to swallow, will do speech /swallow eval, will change metoprolol to IV, HR in 140s, Tylenol supp for fever.  ? Need for NGT

## 2020-04-09 NOTE — PROGRESS NOTE ADULT - SUBJECTIVE AND OBJECTIVE BOX
Patient is a 79y old  Female who presents with a chief complaint of dyspnea (2020 09:37)  clinically improved. More alert.    O2 94 on supplemental oxygen.   no distress.   diabetes  getting under control.   wbc trending down. ON Iv Rocephin      INTERVAL HPI/OVERNIGHT EVENTS:  PAST MEDICAL & SURGICAL HISTORY:  Stented coronary artery  Arthritis  DM (diabetes mellitus)  CAD (coronary artery disease)  Hypertension  Temporal arteritis  H/O:   3-vessel CAD      MEDICATIONS  (STANDING):  amLODIPine   Tablet 10 milliGRAM(s) Oral daily  ascorbic acid 1000 milliGRAM(s) Oral two times a day  aspirin enteric coated 81 milliGRAM(s) Oral daily  atorvastatin 20 milliGRAM(s) Oral at bedtime  cefTRIAXone   IVPB 1000 milliGRAM(s) IV Intermittent every 24 hours  dextrose 5%. 1000 milliLiter(s) (50 mL/Hr) IV Continuous <Continuous>  dextrose 50% Injectable 12.5 Gram(s) IV Push once  dextrose 50% Injectable 25 Gram(s) IV Push once  dextrose 50% Injectable 25 Gram(s) IV Push once  enoxaparin Injectable 30 milliGRAM(s) SubCutaneous two times a day  gabapentin 100 milliGRAM(s) Oral three times a day  insulin glargine Injectable (LANTUS) 30 Unit(s) SubCutaneous at bedtime  insulin lispro (HumaLOG) corrective regimen sliding scale   SubCutaneous three times a day before meals  insulin lispro (HumaLOG) corrective regimen sliding scale   SubCutaneous at bedtime  lisinopril 20 milliGRAM(s) Oral daily  metoprolol tartrate 25 milliGRAM(s) Oral two times a day  nystatin    Suspension 857007 Unit(s) Oral four times a day  petrolatum white Ointment 1 Application(s) Topical two times a day  predniSONE   Tablet 10 milliGRAM(s) Oral daily  senna 2 Tablet(s) Oral at bedtime  sodium chloride 0.9%. 1000 milliLiter(s) (50 mL/Hr) IV Continuous <Continuous>  ticagrelor 60 milliGRAM(s) Oral every 12 hours  zinc sulfate 220 milliGRAM(s) Oral daily    MEDICATIONS  (PRN):  acetaminophen   Tablet .. 650 milliGRAM(s) Oral every 6 hours PRN Temp greater or equal to 38C (100.4F)  acetaminophen   Tablet .. 650 milliGRAM(s) Oral every 6 hours PRN Mild Pain (1 - 3)  acetaminophen   Tablet .. 650 milliGRAM(s) Oral every 4 hours PRN Temp greater or equal to 38.5C (101.3F)  bisacodyl Suppository 10 milliGRAM(s) Rectal daily PRN Constipation  dextrose 40% Gel 15 Gram(s) Oral once PRN Blood Glucose LESS THAN 70 milliGRAM(s)/deciliter  glucagon  Injectable 1 milliGRAM(s) IntraMuscular once PRN Glucose LESS THAN 70 milligrams/deciliter  magnesium hydroxide Suspension 30 milliLiter(s) Oral daily PRN Constipation  nitroglycerin     SubLingual 0.4 milliGRAM(s) SubLingual every 5 minutes PRN Chest Pain      Allergies    codeine (Nausea)    Intolerances        REVIEW OF SYSTEMS:  CONSTITUTIONAL: No fever, weight loss, or fatigue  EYES: No eye pain, visual disturbances, or discharge  ENMT:  No difficulty hearing, tinnitus, vertigo; No sinus or throat pain  NECK: No pain or stiffness  BREASTS: No pain, masses, or nipple discharge  RESPIRATORY: No cough, wheezing, chills or hemoptysis; No shortness of breath  CARDIOVASCULAR: No chest pain, palpitations, dizziness, or leg swelling  GASTROINTESTINAL: No abdominal or epigastric pain. No nausea, vomiting, or hematemesis; No diarrhea or constipation. No melena or hematochezia.  GENITOURINARY: No dysuria, frequency, hematuria, or incontinence  NEUROLOGICAL: No headaches, memory loss, loss of strength, numbness, or tremors  SKIN: No itching, burning, rashes, or lesions   LYMPH NODES: No enlarged glands  ENDOCRINE: No heat or cold intolerance; No hair loss  MUSCULOSKELETAL: No joint pain or swelling; No muscle, back, or extremity pain  PSYCHIATRIC: No depression, anxiety, mood swings, or difficulty sleeping  HEME/LYMPH: No easy bruising, or bleeding gums  ALLERY AND IMMUNOLOGIC: No hives or eczema    Vital Signs Last 24 Hrs  T(C): 36.1 (2020 09:09), Max: 38.6 (2020 04:09)  T(F): 97 (2020 09:09), Max: 101.5 (2020 04:09)  HR: 72 (2020 09:09) (72 - 113)  BP: 131/61 (2020 09:09) (128/46 - 161/74)  BP(mean): --  RR: 20 (2020 09:09) (18 - 20)  SpO2: 94% (2020 09:09) (94% - 99%)    PHYSICAL EXAM:  GENERAL: NAD, well-groomed, well-developed. More alert. . still not verbalising  HEAD:  Atraumatic, Normocephalic  EYES: EOMI, PERRLA, conjunctiva and sclera clear  ENMT: No tonsillar erythema, exudates, or enlargement; Moist mucous membranes, Good dentition, No lesions  NECK: Supple, No JVD, Normal thyroid  NERVOUS SYSTEM:  Alert & Oriented X3, Good concentration; Motor Strength 5/5 B/L upper and lower extremities; DTRs 2+ intact and symmetric  CHEST/LUNG: Clear to percussion bilaterally; No rales, rhonchi, wheezing, or rubs  HEART: Regular rate and rhythm; No murmurs, rubs, or gallops  ABDOMEN: Soft, Nontender, Nondistended; Bowel sounds present  EXTREMITIES:  2+ Peripheral Pulses, No clubbing, cyanosis, or edema  LYMPH: No lymphadenopathy noted  SKIN: No rashes or lesions    LABS:                        9.5    14.55 )-----------( 235      ( 2020 06:22 )             31.4     04-08    142  |  111<H>  |  27<H>  ----------------------------<  232<H>  4.5   |  24  |  0.99    Ca    9.1      2020 06:55  Phos  1.8     08  Mg     2.6                 CAPILLARY BLOOD GLUCOSE      POCT Blood Glucose.: 208 mg/dL (2020 08:17)  POCT Blood Glucose.: 165 mg/dL (2020 21:43)  POCT Blood Glucose.: 227 mg/dL (2020 16:38)  POCT Blood Glucose.: 241 mg/dL (2020 11:28)          Hemoglobin A1C, Whole Blood: 7.8 % ( @ 11:35)    Cholesterol, Serum: 142 mg/dL ( @ 08:49)  HDL Cholesterol, Serum: 21 mg/dL ( @ 08:49)  Triglycerides, Serum: 286 mg/dL ( @ 08:49)      RADIOLOGY & ADDITIONAL TESTS:    Imaging Personally Reviewed:  [ ] YES  [ ] NO    Consultant(s) Notes Reviewed:  [ ] YES  [ ] NO    Care Discussed with Consultants/Other Providers [ ] YES  [ ] NO    Care discussed with family,         [ x ]   yes  [  ]  No    imp:    stable[ ]    unstable[  ]     improving [   ]       unchanged  [x  ]                Plans:  Continue present plans  [ x ] continue IV rocephin, monitor labs, humidified o2. ,                New consult [  ]   specialty  .......               order test[  ]    test name.                  Discharge Planning  [  ]

## 2020-04-09 NOTE — PROGRESS NOTE ADULT - SUBJECTIVE AND OBJECTIVE BOX
Patient is a 79y old  Female who presents with a chief complaint of dyspnea (2020 09:49)    PAST MEDICAL & SURGICAL HISTORY:  Stented coronary artery  Arthritis  DM (diabetes mellitus)  CAD (coronary artery disease)  Hypertension  Temporal arteritis  H/O:   3-vessel CAD    INTERVAL HISTORY: Resting in bed  	  MEDICATIONS:  MEDICATIONS  (STANDING):  amLODIPine   Tablet 10 milliGRAM(s) Oral daily  ascorbic acid 1000 milliGRAM(s) Oral two times a day  aspirin enteric coated 81 milliGRAM(s) Oral daily  atorvastatin 20 milliGRAM(s) Oral at bedtime  cefTRIAXone   IVPB 1000 milliGRAM(s) IV Intermittent every 24 hours  enoxaparin Injectable 30 milliGRAM(s) SubCutaneous two times a day  gabapentin 100 milliGRAM(s) Oral three times a day  insulin glargine Injectable (LANTUS) 30 Unit(s) SubCutaneous at bedtime  insulin lispro (HumaLOG) corrective regimen sliding scale   SubCutaneous three times a day before meals  insulin lispro (HumaLOG) corrective regimen sliding scale   SubCutaneous at bedtime  lisinopril 20 milliGRAM(s) Oral daily  metoprolol tartrate 25 milliGRAM(s) Oral two times a day  nystatin    Suspension 307284 Unit(s) Oral four times a day  predniSONE   Tablet 10 milliGRAM(s) Oral daily  senna 2 Tablet(s) Oral at bedtime  sodium chloride 0.9%. 1000 milliLiter(s) (50 mL/Hr) IV Continuous <Continuous>  ticagrelor 60 milliGRAM(s) Oral every 12 hours  zinc sulfate 220 milliGRAM(s) Oral daily    MEDICATIONS  (PRN):  acetaminophen   Tablet .. 650 milliGRAM(s) Oral every 6 hours PRN Temp greater or equal to 38C (100.4F)  acetaminophen   Tablet .. 650 milliGRAM(s) Oral every 6 hours PRN Mild Pain (1 - 3)  acetaminophen   Tablet .. 650 milliGRAM(s) Oral every 4 hours PRN Temp greater or equal to 38.5C (101.3F)  bisacodyl Suppository 10 milliGRAM(s) Rectal daily PRN Constipation  dextrose 40% Gel 15 Gram(s) Oral once PRN Blood Glucose LESS THAN 70 milliGRAM(s)/deciliter  glucagon  Injectable 1 milliGRAM(s) IntraMuscular once PRN Glucose LESS THAN 70 milligrams/deciliter  magnesium hydroxide Suspension 30 milliLiter(s) Oral daily PRN Constipation  nitroglycerin     SubLingual 0.4 milliGRAM(s) SubLingual every 5 minutes PRN Chest Pain    Vitals:  T(F): 97 (20 @ 09:09), Max: 101.5 (20 @ 04:09)  HR: 72 (20 @ 09:09) (72 - 113)  BP: 131/61 (20 @ 09:09) (128/46 - 161/74)  RR: 20 (20 @ 09:09) (18 - 20)  SpO2: 94% (20 @ 09:09) (94% - 99%)     @ 07:01  -   @ 07:00  --------------------------------------------------------  IN:    Oral Fluid: 120 mL    sodium chloride 0.9%.: 600 mL    Solution: 100 mL  Total IN: 820 mL    OUT:  Total OUT: 0 mL    Total NET: 820 mL    PHYSICAL EXAM:  Neuro: Awake, responsive  CV: S1 S2 RRR  Lungs: CTABL  GI: Soft, BS +, ND, NT  Extremities: No edema    TELEMETRY: RSR    RADIOLOGY: < from: Xray Chest 1 View- PORTABLE-Urgent (20 @ 09:54) >  Heart/Mediastinum/Lungs/Other: The heart size is normal.The lungs demonstrate diffuse patchy bilateral airspace disease. There are no pleural effusions.    < end of copied text >    DIAGNOSTIC TESTING:    [ x] Echocardiogram: < from: Transthoracic Echocardiogram (17 @ 06:39) >  1. Normal mitral valve. Mild mitral regurgitation.  2. Normal trileaflet aortic valve. Mild aortic  insufficiency.  3. Aortic Root: 2.7 cm.  4. Mild left atrial enlargement.  5. Normal left ventricular internal dimensions and wall  thicknesses.  6. Normal Left Ventricular Systolic Function,  (EF = 55 to  60%)  7. Grade II diastolic dysfunction.  8. Normal right atrium.  9. Normal right ventricular size and function.  10. Unable to estimate RVSP.  11. Normal tricuspid valve.  12. Normal pulmonic valve.  13. Trivial pericardial effusion is seen.    < end of copied text >    LABS:	 	    2020 06:55    142    |  111    |  27     ----------------------------<  232    4.5     |  24     |  0.99   2020 19:01    139    |  109    |  28     ----------------------------<  244    4.8     |  24     |  0.98   2020 17:34    134    |  104    |  30     ----------------------------<  249    5.2     |  20     |  1.16     Ca    9.1        2020 06:55  Phos  1.8       2020 06:55  Mg     2.6       2020 06:55                        9.5    14.55 )-----------( 235      ( 2020 06:22 )             31.4 ,                       10.4   20.46 )-----------( 350      ( 2020 06:55 )             34.4 ,                       9.8    18.26 )-----------( 340      ( 2020 19:01 )             32.2 ,                       10.4   16.35 )-----------( 376      ( 2020 17:34 )             34.1     Lipid Profile:  @ 08:49  HDL/Total Cholesterol: --  HDL Chol:              21 mg/dL  Serum Chol:            142 mg/dL  Direct LDL:            65 mg/dL  Triglycerides:         286 mg/dL Patient is a 79y old  Female who presents with a chief complaint of dyspnea (2020 09:49)    PAST MEDICAL & SURGICAL HISTORY:  Stented coronary artery  Arthritis  DM (diabetes mellitus)  CAD (coronary artery disease)  Hypertension  Temporal arteritis  H/O:   3-vessel CAD    INTERVAL HISTORY: Resting in bed, still on 100% NRB  	  MEDICATIONS:  MEDICATIONS  (STANDING):  amLODIPine   Tablet 10 milliGRAM(s) Oral daily  ascorbic acid 1000 milliGRAM(s) Oral two times a day  aspirin enteric coated 81 milliGRAM(s) Oral daily  atorvastatin 20 milliGRAM(s) Oral at bedtime  cefTRIAXone   IVPB 1000 milliGRAM(s) IV Intermittent every 24 hours  enoxaparin Injectable 30 milliGRAM(s) SubCutaneous two times a day  gabapentin 100 milliGRAM(s) Oral three times a day  insulin glargine Injectable (LANTUS) 30 Unit(s) SubCutaneous at bedtime  insulin lispro (HumaLOG) corrective regimen sliding scale   SubCutaneous three times a day before meals  insulin lispro (HumaLOG) corrective regimen sliding scale   SubCutaneous at bedtime  lisinopril 20 milliGRAM(s) Oral daily  metoprolol tartrate 25 milliGRAM(s) Oral two times a day  nystatin    Suspension 360999 Unit(s) Oral four times a day  predniSONE   Tablet 10 milliGRAM(s) Oral daily  senna 2 Tablet(s) Oral at bedtime  sodium chloride 0.9%. 1000 milliLiter(s) (50 mL/Hr) IV Continuous <Continuous>  ticagrelor 60 milliGRAM(s) Oral every 12 hours  zinc sulfate 220 milliGRAM(s) Oral daily    MEDICATIONS  (PRN):  acetaminophen   Tablet .. 650 milliGRAM(s) Oral every 6 hours PRN Temp greater or equal to 38C (100.4F)  acetaminophen   Tablet .. 650 milliGRAM(s) Oral every 6 hours PRN Mild Pain (1 - 3)  acetaminophen   Tablet .. 650 milliGRAM(s) Oral every 4 hours PRN Temp greater or equal to 38.5C (101.3F)  bisacodyl Suppository 10 milliGRAM(s) Rectal daily PRN Constipation  dextrose 40% Gel 15 Gram(s) Oral once PRN Blood Glucose LESS THAN 70 milliGRAM(s)/deciliter  glucagon  Injectable 1 milliGRAM(s) IntraMuscular once PRN Glucose LESS THAN 70 milligrams/deciliter  magnesium hydroxide Suspension 30 milliLiter(s) Oral daily PRN Constipation  nitroglycerin     SubLingual 0.4 milliGRAM(s) SubLingual every 5 minutes PRN Chest Pain    Vitals:  T(F): 97 (20 @ 09:09), Max: 101.5 (20 @ 04:09)  HR: 72 (20 @ 09:09) (72 - 113)  BP: 131/61 (20 @ 09:09) (128/46 - 161/74)  RR: 20 (20 @ 09:09) (18 - 20)  SpO2: 94% (20 @ 09:09) (94% - 99%)     @ 07:01  -   @ 07:00  --------------------------------------------------------  IN:    Oral Fluid: 120 mL    sodium chloride 0.9%.: 600 mL    Solution: 100 mL  Total IN: 820 mL    OUT:  Total OUT: 0 mL    Total NET: 820 mL    PHYSICAL EXAM:  Neuro: Awake, responsive  CV: S1 S2 RRR  Lungs: bibasilar dry crackles   GI: Soft, BS +, ND, NT  Extremities: No edema    TELEMETRY: RSR    RADIOLOGY: < from: Xray Chest 1 View- PORTABLE-Urgent (20 @ 09:54) >  Heart/Mediastinum/Lungs/Other: The heart size is normal.The lungs demonstrate diffuse patchy bilateral airspace disease. There are no pleural effusions.    < end of copied text >    DIAGNOSTIC TESTING:    [ x] Echocardiogram: < from: Transthoracic Echocardiogram (17 @ 06:39) >  1. Normal mitral valve. Mild mitral regurgitation.  2. Normal trileaflet aortic valve. Mild aortic  insufficiency.  3. Aortic Root: 2.7 cm.  4. Mild left atrial enlargement.  5. Normal left ventricular internal dimensions and wall  thicknesses.  6. Normal Left Ventricular Systolic Function,  (EF = 55 to  60%)  7. Grade II diastolic dysfunction.  8. Normal right atrium.  9. Normal right ventricular size and function.  10. Unable to estimate RVSP.  11. Normal tricuspid valve.  12. Normal pulmonic valve.  13. Trivial pericardial effusion is seen.    < end of copied text >    LABS:	 	    2020 06:55    142    |  111    |  27     ----------------------------<  232    4.5     |  24     |  0.99   2020 19:01    139    |  109    |  28     ----------------------------<  244    4.8     |  24     |  0.98   2020 17:34    134    |  104    |  30     ----------------------------<  249    5.2     |  20     |  1.16     Ca    9.1        2020 06:55  Phos  1.8       2020 06:55  Mg     2.6       2020 06:55                        9.5    14.55 )-----------( 235      ( 2020 06:22 )             31.4 ,                       10.4   20.46 )-----------( 350      ( 2020 06:55 )             34.4 ,                       9.8    18.26 )-----------( 340      ( 2020 19:01 )             32.2 ,                       10.4   16.35 )-----------( 376      ( 2020 17:34 )             34.1     Lipid Profile:  @ 08:49  HDL/Total Cholesterol: --  HDL Chol:              21 mg/dL  Serum Chol:            142 mg/dL  Direct LDL:            65 mg/dL  Triglycerides:         286 mg/dL Patient is a 79y old  Female who presents with a chief complaint of dyspnea (2020 09:49)    PAST MEDICAL & SURGICAL HISTORY:  Stented coronary artery  Arthritis  DM (diabetes mellitus)  CAD (coronary artery disease)  Hypertension  Temporal arteritis  H/O:   3-vessel CAD    INTERVAL HISTORY: Resting in bed, still on 100% NRB, lethargic   	  MEDICATIONS:  MEDICATIONS  (STANDING):  amLODIPine   Tablet 10 milliGRAM(s) Oral daily  ascorbic acid 1000 milliGRAM(s) Oral two times a day  aspirin enteric coated 81 milliGRAM(s) Oral daily  atorvastatin 20 milliGRAM(s) Oral at bedtime  cefTRIAXone   IVPB 1000 milliGRAM(s) IV Intermittent every 24 hours  enoxaparin Injectable 30 milliGRAM(s) SubCutaneous two times a day  gabapentin 100 milliGRAM(s) Oral three times a day  insulin glargine Injectable (LANTUS) 30 Unit(s) SubCutaneous at bedtime  insulin lispro (HumaLOG) corrective regimen sliding scale   SubCutaneous three times a day before meals  insulin lispro (HumaLOG) corrective regimen sliding scale   SubCutaneous at bedtime  lisinopril 20 milliGRAM(s) Oral daily  metoprolol tartrate 25 milliGRAM(s) Oral two times a day  nystatin    Suspension 168183 Unit(s) Oral four times a day  predniSONE   Tablet 10 milliGRAM(s) Oral daily  senna 2 Tablet(s) Oral at bedtime  sodium chloride 0.9%. 1000 milliLiter(s) (50 mL/Hr) IV Continuous <Continuous>  ticagrelor 60 milliGRAM(s) Oral every 12 hours  zinc sulfate 220 milliGRAM(s) Oral daily    MEDICATIONS  (PRN):  acetaminophen   Tablet .. 650 milliGRAM(s) Oral every 6 hours PRN Temp greater or equal to 38C (100.4F)  acetaminophen   Tablet .. 650 milliGRAM(s) Oral every 6 hours PRN Mild Pain (1 - 3)  acetaminophen   Tablet .. 650 milliGRAM(s) Oral every 4 hours PRN Temp greater or equal to 38.5C (101.3F)  bisacodyl Suppository 10 milliGRAM(s) Rectal daily PRN Constipation  dextrose 40% Gel 15 Gram(s) Oral once PRN Blood Glucose LESS THAN 70 milliGRAM(s)/deciliter  glucagon  Injectable 1 milliGRAM(s) IntraMuscular once PRN Glucose LESS THAN 70 milligrams/deciliter  magnesium hydroxide Suspension 30 milliLiter(s) Oral daily PRN Constipation  nitroglycerin     SubLingual 0.4 milliGRAM(s) SubLingual every 5 minutes PRN Chest Pain    Vitals:  T(F): 97 (20 @ 09:09), Max: 101.5 (20 @ 04:09)  HR: 72 (20 @ 09:09) (72 - 113)  BP: 131/61 (20 @ 09:09) (128/46 - 161/74)  RR: 20 (20 @ 09:09) (18 - 20)  SpO2: 94% (20 @ 09:09) (94% - 99%)     @ 07:01  -   @ 07:00  --------------------------------------------------------  IN:    Oral Fluid: 120 mL    sodium chloride 0.9%.: 600 mL    Solution: 100 mL  Total IN: 820 mL    OUT:  Total OUT: 0 mL    Total NET: 820 mL    PHYSICAL EXAM:  Neuro: lethargic, easily arousable   CV: S1 S2 RRR  Lungs: bibasilar dry crackles   GI: Soft, BS +, ND, NT  Extremities: No edema    TELEMETRY: sinus 140s    RADIOLOGY: < from: Xray Chest 1 View- PORTABLE-Urgent (20 @ 09:54) >  Heart/Mediastinum/Lungs/Other: The heart size is normal.The lungs demonstrate diffuse patchy bilateral airspace disease. There are no pleural effusions.    < end of copied text >    DIAGNOSTIC TESTING:    [ x] Echocardiogram: < from: Transthoracic Echocardiogram (17 @ 06:39) >  1. Normal mitral valve. Mild mitral regurgitation.  2. Normal trileaflet aortic valve. Mild aortic  insufficiency.  3. Aortic Root: 2.7 cm.  4. Mild left atrial enlargement.  5. Normal left ventricular internal dimensions and wall  thicknesses.  6. Normal Left Ventricular Systolic Function,  (EF = 55 to  60%)  7. Grade II diastolic dysfunction.  8. Normal right atrium.  9. Normal right ventricular size and function.  10. Unable to estimate RVSP.  11. Normal tricuspid valve.  12. Normal pulmonic valve.  13. Trivial pericardial effusion is seen.    < end of copied text >    LABS:	 	    2020 06:55    142    |  111    |  27     ----------------------------<  232    4.5     |  24     |  0.99   2020 19:01    139    |  109    |  28     ----------------------------<  244    4.8     |  24     |  0.98   2020 17:34    134    |  104    |  30     ----------------------------<  249    5.2     |  20     |  1.16     Ca    9.1        2020 06:55  Phos  1.8       2020 06:55  Mg     2.6       2020 06:55                        9.5    14.55 )-----------( 235      ( 2020 06:22 )             31.4 ,                       10.4   20.46 )-----------( 350      ( 2020 06:55 )             34.4 ,                       9.8    18.26 )-----------( 340      ( 2020 19:01 )             32.2 ,                       10.4   16.35 )-----------( 376      ( 2020 17:34 )             34.1     Lipid Profile:  @ 08:49  HDL/Total Cholesterol: --  HDL Chol:              21 mg/dL  Serum Chol:            142 mg/dL  Direct LDL:            65 mg/dL  Triglycerides:         286 mg/dL

## 2020-04-10 NOTE — DIETITIAN INITIAL EVALUATION ADULT. - PERTINENT MEDS FT
Lovenox, Brilinta, Rocephin, Nystatin, Lantus, Humalog sliding scale, Norvasc, Lopressor, Prednisone, Dulcolax, Milk of Magnesia, Senna, Nitrostat, vitamin C, Lipitor, Neurontin, Zestril Zn Sulfate

## 2020-04-10 NOTE — CONSULT NOTE ADULT - ASSESSMENT
79 F with a PMHx of CAD s/p 3 stents, HTN, DM, temporal arteritis, OA admitted to [facility] for COVID + PNA. Now, transferred to ICU after intubation for hypoxic respiratory failure secondary to SARS-CoV2 (COVID 19)     - plan to implement Low tidal volume ventilation strategy with 6ml/kg/IBW,  - will utilize Fio2 and PEEP to obtain P to F ratio >150 ensuring Plateau pressures remain <30 in order to avoid baurotruama. - Ensure pt is adequately sedated with goal RASS -4 to 5 using propofol and fentanyl when possible. If patients develops increasing oxygen requirements will initiate paralytics and consider prone ventilation for 16hs alternating with 8hrs supine. - Will track indices of severity of illness with daily cbc w/ diff for lymphopenia, coags, D-dimer, procal, ESR, CRP, troponin, LDH, Ferritin and troponins.   - Will start on chloroquine 500mg BID or hydroxychloroquine 500mg BID for 2 doses then 200mg BID for 4 days if renal function allows.  Will also add azithromycin to anti-virals.   - Full Code    Pt seen and d/w ICU attending Dr Casillas 79 F with a PMHx of CAD s/p 3 stents, HTN, DM, temporal arteritis, OA admitted to [facility] for COVID + PNA. Now, transferred to ICU after intubation for hypoxic respiratory failure secondary to SARS-CoV2 (COVID 19)     - plan to implement Low tidal volume ventilation strategy with 6ml/kg/IBW,  - will utilize Fio2 and PEEP to obtain P to F ratio >150 ensuring Plateau pressures remain <30 in order to avoid baurotruama. - Ensure pt is adequately sedated with goal RASS -4 to 5 using propofol and fentanyl when possible. If patients develops increasing oxygen requirements will initiate paralytics and consider prone ventilation for 16hs alternating with 8hrs supine. - Will track indices of severity of illness with daily cbc w/ diff for lymphopenia, coags, D-dimer, procal, ESR, CRP, troponin, LDH, Ferritin and troponins.   - Will start on chloroquine 500mg BID or hydroxychloroquine 500mg BID for 2 doses then 200mg BID for 4 days if renal function allows.  Will also add azithromycin to anti-virals.   - Full Code  -contact son (Hermes) [phone] or uncle (Dr. Caraballo) [phone].    Pt seen and d/w ICU attending Dr Casillas

## 2020-04-10 NOTE — PROCEDURE NOTE - NSINDICATIONS_GEN_A_CORE
respiratory failure/respiratory distress
emergency venous access/hemodynamic monitoring/critical illness/volume resuscitation/venous access

## 2020-04-10 NOTE — SWALLOW BEDSIDE ASSESSMENT ADULT - SWALLOW EVAL: DIAGNOSIS
pt presented with reduced cognition/restless, bite reflex, noted dry oral mucosa - red/brown colored dry secretions, and desat without NRB mask. pt not safe for po trials/diet at this time

## 2020-04-10 NOTE — PROCEDURE NOTE - NSPROCDETAILS_GEN_ALL_CORE
patient pre-oxygenated, tube inserted, placement confirmed/connected to ventilator
lumen(s) aspirated and flushed/guidewire recovered/sterile dressing applied/sterile technique, catheter placed

## 2020-04-10 NOTE — DIETITIAN INITIAL EVALUATION ADULT. - OTHER INFO
Unable to conduct face-to-face interview c pt due to isolation precautions for +COVID-19 + cognitive impairment. Per medical record pt was living c spouse PTA but he recently ; was independent c ADL; has supportive son. Per swallow eval (4/10) pt not safe for PO diet at this time due to reduced cognition, requiring NBR O2 support; unable to fully participate in evaluation.  Pt takes Januvia & Novolin 70/30 to control BG at home. No reports of N/V/C/D.

## 2020-04-10 NOTE — SWALLOW BEDSIDE ASSESSMENT ADULT - H & P REVIEW
79F with a PMH of CAD s/p 3 stents, HTN, DM, temperal arteritis, OA who presents to the ED for for 3 days of fever and cough.  Patient admitted for evaluation for COVID19.  Has had symptoms for 3 days.  Symptoms include fever, chills, cough, generalized weakness and increased dyspnea.  Cough has been productive of blood tinged sputum.  Reports multiple of her family members are sick as well.  Presents in moderate to severe respiratory distress.  Rapid response was called as patietn was saturating 75-80% on nonrebreather.  Started on nasal cannula as well, improved to 90%.  Decision was made to monitor.  CXR shows bilateral PNA.  Will admit to tele./yes

## 2020-04-10 NOTE — DIETITIAN INITIAL EVALUATION ADULT. - PERTINENT LABORATORY DATA
04-10 Na151 mmol/L<H> Glu 170 mg/dL<H> K+ 4.9 mmol/L Cr  1.17 mg/dL BUN 30 mg/dL<H> 04-08 Phos 1.8 mg/dL<L> 04-06 Alb 2.1 g/dL<L> 04-04 [id number] 7.8 %<H> 04-08 Chol 142 mg/dL LDL 65 mg/dL HDL 21 mg/dL<L> Trig 286 mg/dL<H>; 04-09 POCT: 208, 167, 163, 114

## 2020-04-10 NOTE — SWALLOW BEDSIDE ASSESSMENT ADULT - COMMENTS
CXR4/8/2020Findings:Lines: None  Heart/Mediastinum/Lungs/Other: The heart size is normal.The lungs demonstrate diffuse patchy bilateral airspace disease. There are no pleural effusions.  Impression:As above.

## 2020-04-10 NOTE — PROGRESS NOTE ADULT - SUBJECTIVE AND OBJECTIVE BOX
Patient is a 79y old  Female who presents with a chief complaint of dyspnea (2020 10:26)  patient  is on Non rebreather and desatutates below 90.  d- dimer, CRP, Ferritin elevated.    patient is not eating or drinking, stuporous , arousable by verbal stimuli.   vitals are stable apart from o2 sat.    INTERVAL HPI/OVERNIGHT EVENTS:  PAST MEDICAL & SURGICAL HISTORY:  Stented coronary artery  Arthritis  DM (diabetes mellitus)  CAD (coronary artery disease)  Hypertension  Temporal arteritis  H/O:   3-vessel CAD      MEDICATIONS  (STANDING):  amLODIPine   Tablet 5 milliGRAM(s) Oral daily  anakinra Injectable 100 milliGRAM(s) SubCutaneous every 6 hours  ascorbic acid 1000 milliGRAM(s) Oral two times a day  aspirin enteric coated 81 milliGRAM(s) Oral daily  atorvastatin 20 milliGRAM(s) Oral at bedtime  cefTRIAXone   IVPB 1000 milliGRAM(s) IV Intermittent every 24 hours  dextrose 5%. 1000 milliLiter(s) (50 mL/Hr) IV Continuous <Continuous>  dextrose 50% Injectable 12.5 Gram(s) IV Push once  dextrose 50% Injectable 25 Gram(s) IV Push once  dextrose 50% Injectable 25 Gram(s) IV Push once  enoxaparin Injectable 30 milliGRAM(s) SubCutaneous two times a day  gabapentin 100 milliGRAM(s) Oral three times a day  insulin glargine Injectable (LANTUS) 30 Unit(s) SubCutaneous at bedtime  insulin lispro (HumaLOG) corrective regimen sliding scale   SubCutaneous three times a day before meals  insulin lispro (HumaLOG) corrective regimen sliding scale   SubCutaneous at bedtime  lisinopril 20 milliGRAM(s) Oral daily  metoprolol tartrate Injectable 5 milliGRAM(s) IV Push every 6 hours  nystatin    Suspension 345992 Unit(s) Oral four times a day  petrolatum white Ointment 1 Application(s) Topical two times a day  predniSONE   Tablet 10 milliGRAM(s) Oral daily  senna 2 Tablet(s) Oral at bedtime  sodium chloride 0.9%. 1000 milliLiter(s) (50 mL/Hr) IV Continuous <Continuous>  ticagrelor 60 milliGRAM(s) Oral every 12 hours  zinc sulfate 220 milliGRAM(s) Oral daily    MEDICATIONS  (PRN):  acetaminophen  Suppository .. 650 milliGRAM(s) Rectal every 6 hours PRN Temp greater or equal to 38C (100.4F), Mild Pain (1 - 3)  bisacodyl Suppository 10 milliGRAM(s) Rectal daily PRN Constipation  dextrose 40% Gel 15 Gram(s) Oral once PRN Blood Glucose LESS THAN 70 milliGRAM(s)/deciliter  glucagon  Injectable 1 milliGRAM(s) IntraMuscular once PRN Glucose LESS THAN 70 milligrams/deciliter  magnesium hydroxide Suspension 30 milliLiter(s) Oral daily PRN Constipation  nitroglycerin     SubLingual 0.4 milliGRAM(s) SubLingual every 5 minutes PRN Chest Pain      Allergies    codeine (Nausea)    Intolerances        REVIEW OF SYSTEMS:  CONSTITUTIONAL: No fever, weight loss, or fatigue  EYES: No eye pain, visual disturbances, or discharge  ENMT:  No difficulty hearing, tinnitus, vertigo; No sinus or throat pain  NECK: No pain or stiffness  BREASTS: No pain, masses, or nipple discharge  RESPIRATORY: No cough, wheezing, chills or hemoptysis; No shortness of breath  CARDIOVASCULAR: No chest pain, palpitations, dizziness, or leg swelling  GASTROINTESTINAL: No abdominal or epigastric pain. No nausea, vomiting, or hematemesis; No diarrhea or constipation. No melena or hematochezia.  GENITOURINARY: No dysuria, frequency, hematuria, or incontinence  NEUROLOGICAL: No headaches, memory loss, loss of strength, numbness, or tremors  SKIN: No itching, burning, rashes, or lesions   LYMPH NODES: No enlarged glands  ENDOCRINE: No heat or cold intolerance; No hair loss  MUSCULOSKELETAL: No joint pain or swelling; No muscle, back, or extremity pain  PSYCHIATRIC: No depression, anxiety, mood swings, or difficulty sleeping  HEME/LYMPH: No easy bruising, or bleeding gums  ALLERY AND IMMUNOLOGIC: No hives or eczema    Vital Signs Last 24 Hrs  T(C): 37.3 (10 Apr 2020 05:04), Max: 39.4 (2020 14:55)  T(F): 99.1 (10 Apr 2020 05:04), Max: 102.9 (2020 14:55)  HR: 100 (10 Apr 2020 05:04) (100 - 150)  BP: 139/59 (10 Apr 2020 05:04) (123/46 - 155/71)  BP(mean): --  RR: 26 (10 Apr 2020 05:04) (26 - 26)  SpO2: 85% (10 Apr 2020 05:04) (85% - 96%)    PHYSICAL EXAM:  GENERAL: NAD, well-groomed, well-developed, stuporous  HEAD:  Atraumatic, Normocephalic  EYES: EOMI, PERRLA, conjunctiva and sclera clear  ENMT: No tonsillar erythema, exudates, or enlargement; Moist mucous membranes, Good dentition, No lesions  NECK: Supple, No JVD, Normal thyroid  NERVOUS SYSTEM:   stuporous, responds to verbal stimuli by opening eyes, moves extremities  CHEST/LUNG: Clear to percussion bilaterally; No rales, rhonchi, wheezing, or rubs  HEART: Regular rate and rhythm; No murmurs, rubs, or gallops  ABDOMEN: Soft, Nontender, Nondistended; Bowel sounds present  EXTREMITIES:  2+ Peripheral Pulses, No clubbing, cyanosis, or edema  LYMPH: No lymphadenopathy noted  SKIN: No rashes or lesions    LABS:                        10.4   15.20 )-----------( 222      ( 10 Apr 2020 06:38 )             34.8     04-10    151<H>  |  122<H>  |  30<H>  ----------------------------<  170<H>  4.9   |  20<L>  |  1.17    Ca    9.6      10 Apr 2020 06:38            CAPILLARY BLOOD GLUCOSE      POCT Blood Glucose.: 158 mg/dL (10 Apr 2020 08:59)  POCT Blood Glucose.: 114 mg/dL (2020 23:49)  POCT Blood Glucose.: 163 mg/dL (2020 18:16)  POCT Blood Glucose.: 167 mg/dL (2020 10:59)          Hemoglobin A1C, Whole Blood: 7.8 % ( @ 11:35)    Cholesterol, Serum: 142 mg/dL ( @ 08:49)  HDL Cholesterol, Serum: 21 mg/dL ( @ 08:49)  Triglycerides, Serum: 286 mg/dL ( @ 08:49)      RADIOLOGY & ADDITIONAL TESTS:    Imaging Personally Reviewed:  [ ] YES  [ ] NO    Consultant(s) Notes Reviewed:  [ ] YES  [ ] NO    Care Discussed with Consultants/Other Providers [ ] YES  [ ] NO    Care discussed with family,         [  ]   yes  [  ]  No    imp:    stable[ ]    unstable[x  ]     improving [   ]       unchanged  [  ]                Plans:  Continue present plans  [x  ] will start anakinra, , ID consult requested. , . will place feeding tube for enteral nutrition               New consult [  ]   specialty  .......               order test[  ]    test name.  labs in am- ferritin, crp, D-dimer, cbc, BMP                Discharge Planning  [  ]

## 2020-04-10 NOTE — SWALLOW BEDSIDE ASSESSMENT ADULT - SLP GENERAL OBSERVATIONS
pt seen bedside NRB 02, alert and restless/moaning. pt nonverbal, essentially noncommunicative except facial grimace or moaning with care. pt desat without NRB for during oral care.

## 2020-04-10 NOTE — CONSULT NOTE ADULT - SUBJECTIVE AND OBJECTIVE BOX
79 F with a PMHx of CAD s/p 3 stents, HTN, DM, temporal arteritis, OA who presented to the ED on 4/3 c/o 3 days of fever, chills, generalized weakness, and cough. Pt reports multiple sick contacts. Pt COVID + and admitted under medicine tele service for further management. ICU consulted for worsening hypoxemia, desat below 90s on NRB. Pt with increasing d-dimer, CRP, ferritin. Pt stuporous and groaning upon assessment. After discussion with family, decision was made to intubate patient.    PHYSICAL EXAM:  GENERAL: elderly, cachetic and frail. Stuporous  HEAD:  NC/AC  EYES: EOMI, PERRLA, conjunctiva and sclera clear  NECK: Supple, No JVD  CHEST/LUNG: CTAB. No cough, wheeze, rales.   HEART: Regular rate and rhythm. No murmurs, rubs, or gallops.   ABDOMEN: Soft, Nontender, Nondistended. Bowel sounds present  EXTREMITIES:  2+ Peripheral Pulses, No clubbing, cyanosis, or edema  NEUROLOGY: non-focal  SKIN: No rashes or lesions    .  LABS:                         10.4   15.20 )-----------( 222      ( 10 Apr 2020 06:38 )             34.8     04-10    151<H>  |  122<H>  |  30<H>  ----------------------------<  170<H>  4.9   |  20<L>  |  1.17    Ca    9.6      10 Apr 2020 06:38      RADIOLOGY, EKG & ADDITIONAL TESTS: Reviewed.

## 2020-04-10 NOTE — DIETITIAN INITIAL EVALUATION ADULT. - ENERGY NEEDS
Height (cm): 162.56  Weight (kg): 72.6 (04-02)  BMI = 27.5  IBW:  54.5 kg +/- 10%    % IBW:  133%      UBW:  unknown    %UBW: unknown

## 2020-04-10 NOTE — PROGRESS NOTE ADULT - ASSESSMENT
covid PN doing poorly- full code   dm   cad with stent   HTN   NSTMI.   patient doing poorly- prognosis poor.

## 2020-04-10 NOTE — DIETITIAN INITIAL EVALUATION ADULT. - ENTERAL
Bolus TF via NGT; Glucerna 1.2; 300 ml q 6 hrs (provides 1200 ml, 1440 kcal, 72 g protein, 972 ml H2O)

## 2020-04-10 NOTE — SWALLOW BEDSIDE ASSESSMENT ADULT - MUCOSAL QUALITY
xerostomia- dry with red/brown tinged secretion on her lower lip/anterior sulcus and lower teeth. pt with bite reflex given oral care. RN present

## 2020-04-11 NOTE — PROGRESS NOTE ADULT - REASON FOR ADMISSION
dyspnea
dyspnea/ covid PNA

## 2020-04-11 NOTE — DISCHARGE NOTE FOR THE EXPIRED PATIENT - HOSPITAL COURSE
9F with a PMH of CAD s/p 3 stents, HTN, DM, temperal arteritis, OA who presents to the ED for for 3 days of fever and cough.  Patient admitted for evaluation for COVID19.  Has had symptoms for 3 days.  Symptoms include fever, chills, cough, generalized weakness and increased dyspnea.  Cough has been productive of blood tinged sputum.  Reports multiple of her family members are sick as well.  Presents in moderate to severe respiratory distress.  Rapid response was called as patietn was saturating 75-80% on nonrebreather.  Started on nasal cannula as well, improved to 90%.  Decision was made to monitor.  CXR shows bilateral PNA.  Will admit to tele. Patient status declined on 4/10 and was intubated and brought to ICU.  Patient went into sudden asystole cardiac arrest on 4/11 and was pronounced at 1:24pm. Family notified.

## 2020-04-11 NOTE — PROGRESS NOTE ADULT - NSHPATTENDINGPLANDISCUSS_GEN_ALL_CORE
Patient/ Dr Lucero
family/ team.
patient/ rn
rn/ team/ son, Devan
so, PMD Dr Carlisle. / RN/ Team
son / RN
son/ RN
team.
team. CHRISTOPHER bell
NP

## 2020-04-11 NOTE — PROGRESS NOTE ADULT - SUBJECTIVE AND OBJECTIVE BOX
Patient is a 79y old  Female who presents with a chief complaint of dyspnea (10 Apr 2020 13:22)  patient doing poorly, on pressors, intubated, sedated    INTERVAL HPI/OVERNIGHT EVENTS:  PAST MEDICAL & SURGICAL HISTORY:  Stented coronary artery  Arthritis  DM (diabetes mellitus)  CAD (coronary artery disease)  Hypertension  Temporal arteritis  H/O:   3-vessel CAD      MEDICATIONS  (STANDING):  anakinra Injectable 100 milliGRAM(s) SubCutaneous every 6 hours  aspirin enteric coated 81 milliGRAM(s) Oral daily  atorvastatin 20 milliGRAM(s) Oral at bedtime  cefTRIAXone   IVPB 1000 milliGRAM(s) IV Intermittent every 24 hours  chlorhexidine 4% Liquid 1 Application(s) Topical <User Schedule>  dextrose 5%. 1000 milliLiter(s) (50 mL/Hr) IV Continuous <Continuous>  dextrose 50% Injectable 12.5 Gram(s) IV Push once  dextrose 50% Injectable 25 Gram(s) IV Push once  dextrose 50% Injectable 25 Gram(s) IV Push once  enoxaparin Injectable 30 milliGRAM(s) SubCutaneous two times a day  gabapentin 100 milliGRAM(s) Oral three times a day  insulin glargine Injectable (LANTUS) 30 Unit(s) SubCutaneous at bedtime  insulin lispro (HumaLOG) corrective regimen sliding scale   SubCutaneous three times a day before meals  insulin lispro (HumaLOG) corrective regimen sliding scale   SubCutaneous at bedtime  midazolam Infusion 0.02 mG/kG/Hr (1.45 mL/Hr) IV Continuous <Continuous>  norepinephrine Infusion 0.05 MICROgram(s)/kG/Min (6.81 mL/Hr) IV Continuous <Continuous>  petrolatum white Ointment 1 Application(s) Topical two times a day  predniSONE   Tablet 10 milliGRAM(s) Oral daily  propofol Infusion 30.073 MICROgram(s)/kG/Min (13.1 mL/Hr) IV Continuous <Continuous>  senna 2 Tablet(s) Oral at bedtime  ticagrelor 60 milliGRAM(s) Oral every 12 hours    MEDICATIONS  (PRN):  acetaminophen    Suspension .. 650 milliGRAM(s) Oral every 6 hours PRN Temp greater or equal to 38C (100.4F), Mild Pain (1 - 3)  bisacodyl Suppository 10 milliGRAM(s) Rectal daily PRN Constipation  dextrose 40% Gel 15 Gram(s) Oral once PRN Blood Glucose LESS THAN 70 milliGRAM(s)/deciliter  glucagon  Injectable 1 milliGRAM(s) IntraMuscular once PRN Glucose LESS THAN 70 milligrams/deciliter      Allergies    codeine (Nausea)    Intolerances        REVIEW OF SYSTEMS:  CONSTITUTIONAL: No fever, weight loss, or fatigue  EYES: No eye pain, visual disturbances, or discharge  ENMT:  No difficulty hearing, tinnitus, vertigo; No sinus or throat pain  NECK: No pain or stiffness  BREASTS: No pain, masses, or nipple discharge  RESPIRATORY: No cough, wheezing, chills or hemoptysis; No shortness of breath  CARDIOVASCULAR: No chest pain, palpitations, dizziness, or leg swelling  GASTROINTESTINAL: No abdominal or epigastric pain. No nausea, vomiting, or hematemesis; No diarrhea or constipation. No melena or hematochezia.  GENITOURINARY: No dysuria, frequency, hematuria, or incontinence  NEUROLOGICAL: No headaches, memory loss, loss of strength, numbness, or tremors  SKIN: No itching, burning, rashes, or lesions   LYMPH NODES: No enlarged glands  ENDOCRINE: No heat or cold intolerance; No hair loss  MUSCULOSKELETAL: No joint pain or swelling; No muscle, back, or extremity pain  PSYCHIATRIC: No depression, anxiety, mood swings, or difficulty sleeping  HEME/LYMPH: No easy bruising, or bleeding gums  ALLERY AND IMMUNOLOGIC: No hives or eczema    Vital Signs Last 24 Hrs  T(C): 37.9 (2020 08:00), Max: 38.7 (10 Apr 2020 13:42)  T(F): 100.3 (2020 08:00), Max: 101.6 (10 Apr 2020 13:42)  HR: 102 (2020 08:00) (79 - 118)  BP: 94/52 (2020 08:00) (79/61 - 130/63)  BP(mean): 66 (2020 08:00) (59 - 96)  RR: 20 (2020 08:00) (18 - 30)  SpO2: 100% (2020 08:00) (89% - 100%)    PHYSICAL EXAM:  GENERAL: NAD, well-groomed, well-developed  HEAD:  Atraumatic, Normocephalic  EYES: EOMI, PERRLA, conjunctiva and sclera clear  ENMT: No tonsillar erythema, exudates, or enlargement; Moist mucous membranes, Good dentition, No lesions  NECK: Supple, No JVD, Normal thyroid  NERVOUS SYSTEM:   sedated, Intubated  CHEST/LUNG: Clear to percussion bilaterally; No rales, rhonchi, wheezing, or rubs  HEART: Regular rate and rhythm; No murmurs, rubs, or gallops  ABDOMEN: Soft, Nontender, Nondistended; Bowel sounds present  EXTREMITIES:  2+ Peripheral Pulses, No clubbing, cyanosis, or edema  LYMPH: No lymphadenopathy noted  SKIN: No rashes or lesions    LABS:                        9.1    14.04 )-----------( 127      ( 2020 04:53 )             32.1     04-11    153<H>  |  122<H>  |  51<H>  ----------------------------<  195<H>  5.1   |  23  |  2.95<H>    Ca    8.6      2020 04:53  Phos  5.0     -11  Mg     2.7     04-11            CAPILLARY BLOOD GLUCOSE      POCT Blood Glucose.: 115 mg/dL (2020 11:29)  POCT Blood Glucose.: 199 mg/dL (2020 06:03)  POCT Blood Glucose.: 182 mg/dL (10 Apr 2020 21:54)    ABG - ( 10 Apr 2020 15:32 )  pH, Arterial: x     pH, Blood: 7.37  /  pCO2: 39    /  pO2: 175   / HCO3: 22    / Base Excess: -2.8  /  SaO2: 99                      Cholesterol, Serum: 142 mg/dL ( @ 08:49)  HDL Cholesterol, Serum: 21 mg/dL ( @ 08:49)  Triglycerides, Serum: 286 mg/dL ( @ 08:49)      RADIOLOGY & ADDITIONAL TESTS:    Imaging Personally Reviewed:  [ ] YES  [ ] NO    Consultant(s) Notes Reviewed:  [ ] YES  [ ] NO    Care Discussed with Consultants/Other Providers [ ] YES  [ ] NO    Care discussed with family,         [ x ]   yes  [  ]  No    imp:    stable[ ]    unstable[ x ]     improving [   ]       unchanged  [  ]                Plans:  Continue present plans  [ x] as per critical care               New consult [  ]   specialty  .......               order test[  ]    test name.                  Discharge Planning  [  ]

## 2020-04-11 NOTE — PROGRESS NOTE ADULT - ASSESSMENT
critically ill patient with ARDS , covid PNA   doing poorly   poor prognosis discussed with CHRISTOPHER burton

## 2020-04-16 LAB — GLUCOSE BLDC GLUCOMTR-MCNC: 114 MG/DL — HIGH (ref 70–99)

## 2021-09-29 NOTE — ED ADULT NURSE NOTE - NS ED NURSE DISCH DISPOSITION
"Called and spoke with Jass regarding Pt's pain meds RT a call I got from pharmacy regarding quantity and discrepancy in PRN instructions. We realized when I previllously asked about Pt daily usage, she thought I was asking per daytime and not a 24 hour interval. Pt use fluctuates but on \"bad\" days is as much as 6 per day. Discussed the option of adding a long acting medication vs issuing a q 4 h Rx and Jass states that with many people managing the Pt's medication the fewer # of different pills the better. She feels that this along with Pt's current pain reports would make her prefer a 5 mg oxy q 4 h Rx than a combo with a long acting med.    Called you to update that new Rx had been signed and if this did not meet pain needs we would explore longer acting medication. Jass verbalized understanding.  " Admitted

## 2022-08-06 NOTE — PROVIDER CONTACT NOTE (CRITICAL VALUE NOTIFICATION) - NS PROVIDER READ BACK TO LAB
yes Winlevi Counseling:  I discussed with the patient the risks of topical clascoterone including but not limited to erythema, scaling, itching, and stinging. Patient voiced their understanding.

## 2023-10-23 NOTE — ED ADULT NURSE NOTE - ED STAT RN HANDOFF DETAILS 2
24-Oct-2023 02:17
Assuming patient's care for coverage. Report received from RN and patient informed during rounding. Assessment available on KB. Will continue to monitor

## 2024-09-20 NOTE — H&P ADULT - PROBLEM SELECTOR PLAN 4
lipitor PMHx:  HTN, HLD. presents with acute onset gait difficulties, slurred speech, diplopia. LKN is 0700am 9/18. Per pt's family he was at his baseline yesterday morning but then later around noon time was experiencing some gait difficulties. wife noticed him limping to the L side. During the day the pt stayed in bed mostly and did not notice gait difficulties getting worse as he was mostly resting or sleeping. At 2 AM 9/19 he was going to the bathroom and was feeling very imbalanced and unable to walk unassisted.  Pt also reports intermittent dizziness where he felt like he was spinning around. He did not have any nausea or vomiting. This morning his daughter noted that he was unable to use his phone with the L hand and was unable to walk unassisted, requiring daughter or EMS to help out. He is not on any AC/AP at home. While in the ED pt initially had some sensory deficit LLE which later improved on subsequent exam. CTH: without acute infarct, CTA head and neck with L PICA occlusion, L vertebral severe stenosis. Diffuse intracranial atherosclerotic disease seen. NIHSS: 5. Not a tenecteplase candidate as patient is outside of the appropriate window. Not a thrombectomy candidate as no LVO is seen. MRI: Acute/subacute infarction within the right side of the elba as well as left lateral genu of the corpus callosum with associated cytotoxic edema and without associated hemorrhage.

## 2024-10-07 NOTE — H&P ADULT - PROBLEM/PLAN-2
[facility] - Inpatient Rehabilitation Department   Phone: [phone]    Speech Therapy    [] Initial Evaluation            [x] Daily Treatment Note         [] Discharge Summary      Patient: Rita Dixon   : 1954   MRN: [id number]   Date of Service:  10/7/2024  Admitting Diagnosis: Acute cerebrovascular accident (CVA) due to ischemia (HCC)  Current Admission Summary: See rehab MD note  Past Medical History:  has a past medical history of Arthritis, GBM (glioblastoma multiforme) (HCC), HTN (hypertension), Kidney stones, Osteoporosis, and Seizures (HCC).  Past Surgical History:  has a past surgical history that includes brain surgery (Right, 2023); Eye surgery (Left); and craniotomy (Right, 2024).  Recent MRI Brain: 24  1. Acute infarct with associated FLAIR signal abnormality in the right basal ganglia and corona radiata. No significant midline shift or evidence of hemorrhagic conversion.   2. Mild diffusion restriction in the right hippocampus may represent sequela of seizures or ischemia.   3. Postoperative changes of right anterior temporal mass resection. Punctate foci of enhancement at the resection site are favored postsurgical, with attention on follow-up recommended.   4. Unchanged trace intraventricular hemorrhage with unchanged ventricular size and configuration.   Instrumental Swallow Study: 24  Patient presents with grossly normal oropharyngeal swallow function with observed flash laryngeal penetration of thin liquids and puree intermittently which fully clears during the swallow. There is no significant post-swallow residue in the pharynx. Mastication is timely and effective, with adequate oral clearance. Swallow safety and swallow efficiency are grossly WFL.   Precautions/Restrictions: high fall risk        Pre-Admission Information   Living Status: Pt lives at home with  who manages medications and finances. Pt previously assisted with simple  cleaning/cooking and is not an active .  Occupation/School: Pt has a high school education and completed a nursing program. She has held various jobs and most recently worked for the [employer] until 2017.   Medication Management: :  []Primary   []Secondary [x]No  Finance Management: []Primary   []Secondary [x]No  Active :   []Yes         [x]No  Hearing:    WFL  Vision:    Vision Corrective Device: wears glasses for reading, left inattention      Subjective  General: Pt was upright in w/c with L arm tray in place for improved positioning during second session. Pt pleasant and cooperative but tangential, requiring redirection back to task/ question asked. In both sessions, pt became tearful and expressed that she was feeling upset over how her medical status is impacting her family members and stated that she wanted to get back to her normal routine and living situation.      Pain: Did not state    Safety Interventions: patient left in chair, chair alarm in place, call light within reach, and patient at risk for falls      Therapeutic Interventions:     Session 1:     Dysphagia: Severity: Mild   Tolerance of recommended diet   - pt with prolonged but functional mastication and eventual adequate oral clearance   - pt drank thin liquids via straw with no overt s/s of aspiration observed throughout session.   - pt with intermittent throat clears throughout completion of meal, however, appearance overall indicated no immediate overt clinical s/s of aspiration with adequate tolerance across meal  - pt with anterior bolus loss of soft solids out of left side with varying levels of awareness  - pt continues with taking large bites, requires extended time for oral clearance      Cognition: Severity: Moderate   Orientation  - pt able to independently state month, SANTOSH, and time  - verbal cues provided to locate calendar/schedule for specific date    Thought Organization/Working Memory:   - pt able to  DISPLAY PLAN FREE TEXT

## 2024-11-04 NOTE — ED ADULT NURSE NOTE - BREATH SOUNDS, RIGHT
[Yes] : Reviewed medication list for presence of high-risk medications. [Patient/caregiver able to verbalize understanding of medications, including indications and side effects] : Patient/caregiver able to verbalize understanding of medications, including indications and side effects [] : does not miss any medication doses diminished
